# Patient Record
Sex: MALE | Race: WHITE | NOT HISPANIC OR LATINO | Employment: FULL TIME | URBAN - METROPOLITAN AREA
[De-identification: names, ages, dates, MRNs, and addresses within clinical notes are randomized per-mention and may not be internally consistent; named-entity substitution may affect disease eponyms.]

---

## 2017-05-10 ENCOUNTER — TRANSFERRED RECORDS (OUTPATIENT)
Dept: HEALTH INFORMATION MANAGEMENT | Facility: CLINIC | Age: 32
End: 2017-05-10

## 2017-06-22 ENCOUNTER — TRANSFERRED RECORDS (OUTPATIENT)
Dept: HEALTH INFORMATION MANAGEMENT | Facility: CLINIC | Age: 32
End: 2017-06-22

## 2021-05-11 ENCOUNTER — OFFICE VISIT - HEALTHEAST (OUTPATIENT)
Dept: FAMILY MEDICINE | Facility: CLINIC | Age: 36
End: 2021-05-11

## 2021-05-11 DIAGNOSIS — Z13.220 SCREENING FOR HYPERLIPIDEMIA: ICD-10-CM

## 2021-05-11 DIAGNOSIS — Z00.00 ANNUAL PHYSICAL EXAM: ICD-10-CM

## 2021-05-11 DIAGNOSIS — M62.838 MUSCLE SPASM: ICD-10-CM

## 2021-05-11 DIAGNOSIS — Z76.89 ENCOUNTER TO ESTABLISH CARE: ICD-10-CM

## 2021-05-11 DIAGNOSIS — H00.014 HORDEOLUM EXTERNUM OF LEFT UPPER EYELID: ICD-10-CM

## 2021-05-11 DIAGNOSIS — J45.990 EXERCISE-INDUCED ASTHMA: ICD-10-CM

## 2021-05-11 DIAGNOSIS — R06.83 SNORING: ICD-10-CM

## 2021-05-11 DIAGNOSIS — M25.611 SHOULDER STIFFNESS, RIGHT: ICD-10-CM

## 2021-05-11 DIAGNOSIS — M75.21 BICEPS TENDONITIS, RIGHT: ICD-10-CM

## 2021-05-11 DIAGNOSIS — Z13.1 SCREENING FOR DIABETES MELLITUS: ICD-10-CM

## 2021-05-11 DIAGNOSIS — F32.A DEPRESSION, UNSPECIFIED DEPRESSION TYPE: ICD-10-CM

## 2021-05-11 LAB
CHOLEST SERPL-MCNC: 226 MG/DL
FASTING STATUS PATIENT QL REPORTED: YES
FASTING STATUS PATIENT QL REPORTED: YES
GLUCOSE BLD-MCNC: 68 MG/DL (ref 70–99)
HDLC SERPL-MCNC: 50 MG/DL
LDLC SERPL CALC-MCNC: 158 MG/DL
TRIGL SERPL-MCNC: 91 MG/DL

## 2021-05-11 ASSESSMENT — MIFFLIN-ST. JEOR: SCORE: 1778.5

## 2021-05-27 VITALS
RESPIRATION RATE: 16 BRPM | SYSTOLIC BLOOD PRESSURE: 132 MMHG | OXYGEN SATURATION: 98 % | BODY MASS INDEX: 28.57 KG/M2 | WEIGHT: 188.5 LBS | TEMPERATURE: 98 F | HEART RATE: 88 BPM | HEIGHT: 68 IN | DIASTOLIC BLOOD PRESSURE: 88 MMHG

## 2021-05-28 ASSESSMENT — ASTHMA QUESTIONNAIRES: ACT_TOTALSCORE: 23

## 2021-06-01 ENCOUNTER — COMMUNICATION - HEALTHEAST (OUTPATIENT)
Dept: FAMILY MEDICINE | Facility: CLINIC | Age: 36
End: 2021-06-01

## 2021-06-02 ENCOUNTER — OFFICE VISIT - HEALTHEAST (OUTPATIENT)
Dept: PHYSICAL THERAPY | Facility: REHABILITATION | Age: 36
End: 2021-06-02

## 2021-06-02 ENCOUNTER — MEDICAL CORRESPONDENCE (OUTPATIENT)
Dept: HEALTH INFORMATION MANAGEMENT | Facility: CLINIC | Age: 36
End: 2021-06-02

## 2021-06-02 DIAGNOSIS — M25.512 PAIN OF BOTH SHOULDER JOINTS: ICD-10-CM

## 2021-06-02 DIAGNOSIS — M25.511 PAIN OF BOTH SHOULDER JOINTS: ICD-10-CM

## 2021-06-02 DIAGNOSIS — M54.2 CERVICALGIA: ICD-10-CM

## 2021-06-02 DIAGNOSIS — M25.511 PAIN IN JOINT OF RIGHT SHOULDER: ICD-10-CM

## 2021-06-02 DIAGNOSIS — R29.3 ABNORMAL POSTURE: ICD-10-CM

## 2021-06-06 ENCOUNTER — HEALTH MAINTENANCE LETTER (OUTPATIENT)
Age: 36
End: 2021-06-06

## 2021-06-14 ENCOUNTER — COMMUNICATION - HEALTHEAST (OUTPATIENT)
Dept: FAMILY MEDICINE | Facility: CLINIC | Age: 36
End: 2021-06-14

## 2021-06-14 ENCOUNTER — OFFICE VISIT - HEALTHEAST (OUTPATIENT)
Dept: FAMILY MEDICINE | Facility: CLINIC | Age: 36
End: 2021-06-14

## 2021-06-14 ENCOUNTER — COMMUNICATION - HEALTHEAST (OUTPATIENT)
Dept: SCHEDULING | Facility: CLINIC | Age: 36
End: 2021-06-14

## 2021-06-14 DIAGNOSIS — J30.9 ALLERGIC RHINITIS, UNSPECIFIED SEASONALITY, UNSPECIFIED TRIGGER: ICD-10-CM

## 2021-06-14 DIAGNOSIS — J45.990 EXERCISE-INDUCED ASTHMA: ICD-10-CM

## 2021-06-14 DIAGNOSIS — Z01.818 PREOP GENERAL PHYSICAL EXAM: ICD-10-CM

## 2021-06-14 DIAGNOSIS — R06.83 SNORING: ICD-10-CM

## 2021-06-14 DIAGNOSIS — F32.A DEPRESSION, UNSPECIFIED DEPRESSION TYPE: ICD-10-CM

## 2021-06-14 DIAGNOSIS — N20.1 URETERAL CALCULUS: ICD-10-CM

## 2021-06-14 ASSESSMENT — SLEEP AND FATIGUE QUESTIONNAIRES
HOW LIKELY ARE YOU TO NOD OFF OR FALL ASLEEP WHILE SITTING AND TALKING TO SOMEONE: WOULD NEVER DOZE
HOW LIKELY ARE YOU TO NOD OFF OR FALL ASLEEP WHILE LYING DOWN TO REST IN THE AFTERNOON WHEN CIRCUMSTANCES PERMIT: SLIGHT CHANCE OF DOZING
HOW LIKELY ARE YOU TO NOD OFF OR FALL ASLEEP WHEN YOU ARE A PASSENGER IN A CAR FOR AN HOUR WITHOUT A BREAK: SLIGHT CHANCE OF DOZING
HOW LIKELY ARE YOU TO NOD OFF OR FALL ASLEEP IN A CAR, WHILE STOPPED FOR A FEW MINUTES IN TRAFFIC: SLIGHT CHANCE OF DOZING
HOW LIKELY ARE YOU TO NOD OFF OR FALL ASLEEP WHILE SITTING AND READING: HIGH CHANCE OF DOZING
HOW LIKELY ARE YOU TO NOD OFF OR FALL ASLEEP WHILE SITTING INACTIVE IN A PUBLIC PLACE: MODERATE CHANCE OF DOZING
HOW LIKELY ARE YOU TO NOD OFF OR FALL ASLEEP WHILE WATCHING TV: HIGH CHANCE OF DOZING
HOW LIKELY ARE YOU TO NOD OFF OR FALL ASLEEP WHILE SITTING QUIETLY AFTER LUNCH WITHOUT ALCOHOL: MODERATE CHANCE OF DOZING

## 2021-06-14 ASSESSMENT — ENCOUNTER SYMPTOMS
NECK PAIN: 1
STIFFNESS: 1
HEMATURIA: 0
TROUBLE SWALLOWING: 0
DYSURIA: 0
MYALGIAS: 1
DIFFICULTY URINATING: 0
SORE THROAT: 0
BACK PAIN: 0
MUSCLE WEAKNESS: 0
SINUS CONGESTION: 1
JOINT SWELLING: 0
HOARSE VOICE: 0
SINUS PAIN: 1
SMELL DISTURBANCE: 0
ARTHRALGIAS: 0
FLANK PAIN: 1
TASTE DISTURBANCE: 0
MUSCLE CRAMPS: 0
NECK MASS: 0

## 2021-06-14 ASSESSMENT — MIFFLIN-ST. JEOR: SCORE: 1787.43

## 2021-06-14 ASSESSMENT — PATIENT HEALTH QUESTIONNAIRE - PHQ9: SUM OF ALL RESPONSES TO PHQ QUESTIONS 1-9: 7

## 2021-06-15 ENCOUNTER — VIRTUAL VISIT (OUTPATIENT)
Dept: SLEEP MEDICINE | Facility: CLINIC | Age: 36
End: 2021-06-15
Payer: COMMERCIAL

## 2021-06-15 VITALS — HEIGHT: 69 IN | WEIGHT: 188 LBS | BODY MASS INDEX: 27.85 KG/M2

## 2021-06-15 DIAGNOSIS — G47.10 HYPERSOMNIA: ICD-10-CM

## 2021-06-15 DIAGNOSIS — R06.83 SNORING: ICD-10-CM

## 2021-06-15 DIAGNOSIS — G47.00 FREQUENT NOCTURNAL AWAKENING: ICD-10-CM

## 2021-06-15 DIAGNOSIS — R06.81 WITNESSED EPISODE OF APNEA: Primary | ICD-10-CM

## 2021-06-15 PROBLEM — F32.A DEPRESSION, UNSPECIFIED DEPRESSION TYPE: Status: ACTIVE | Noted: 2021-05-11

## 2021-06-15 PROBLEM — J45.990 EXERCISE-INDUCED ASTHMA: Status: ACTIVE | Noted: 2021-05-11

## 2021-06-15 PROBLEM — N20.1 URETERAL CALCULUS: Status: ACTIVE | Noted: 2021-06-14

## 2021-06-15 PROCEDURE — 99203 OFFICE O/P NEW LOW 30 MIN: CPT | Mod: GT | Performed by: INTERNAL MEDICINE

## 2021-06-15 RX ORDER — ALBUTEROL SULFATE 90 UG/1
AEROSOL, METERED RESPIRATORY (INHALATION)
COMMUNITY
Start: 2021-05-22 | End: 2022-08-10

## 2021-06-15 RX ORDER — OXYCODONE AND ACETAMINOPHEN 5; 325 MG/1; MG/1
TABLET ORAL
COMMUNITY
Start: 2021-06-12 | End: 2021-11-19

## 2021-06-15 RX ORDER — CYCLOBENZAPRINE HCL 5 MG
TABLET ORAL
COMMUNITY
Start: 2021-05-22 | End: 2021-09-30

## 2021-06-15 RX ORDER — FLUTICASONE PROPIONATE 50 MCG
SPRAY, SUSPENSION (ML) NASAL
COMMUNITY
Start: 2021-06-14

## 2021-06-15 RX ORDER — ONDANSETRON 4 MG/1
4 TABLET, ORALLY DISINTEGRATING ORAL
COMMUNITY
Start: 2021-05-25 | End: 2021-11-19

## 2021-06-15 RX ORDER — MONTELUKAST SODIUM 10 MG/1
TABLET ORAL
COMMUNITY
Start: 2021-05-05 | End: 2022-08-10

## 2021-06-15 RX ORDER — TAMSULOSIN HYDROCHLORIDE 0.4 MG/1
0.4 CAPSULE ORAL DAILY
COMMUNITY
Start: 2021-05-28 | End: 2021-11-19

## 2021-06-15 RX ORDER — LORATADINE 10 MG/1
10 TABLET ORAL
COMMUNITY
Start: 2021-06-14 | End: 2022-07-20

## 2021-06-15 ASSESSMENT — MIFFLIN-ST. JEOR: SCORE: 1780.2

## 2021-06-15 NOTE — PATIENT INSTRUCTIONS
Your BMI is Body mass index is 28.17 kg/m .  Weight management is a personal decision.  If you are interested in exploring weight loss strategies, the following discussion covers the approaches that may be successful. Body mass index (BMI) is one way to tell whether you are at a healthy weight, overweight, or obese. It measures your weight in relation to your height.  A BMI of 18.5 to 24.9 is in the healthy range. A person with a BMI of 25 to 29.9 is considered overweight, and someone with a BMI of 30 or greater is considered obese. More than two-thirds of American adults are considered overweight or obese.  Being overweight or obese increases the risk for further weight gain. Excess weight may lead to heart disease and diabetes.  Creating and following plans for healthy eating and physical activity may help you improve your health.  Weight control is part of healthy lifestyle and includes exercise, emotional health, and healthy eating habits. Careful eating habits lifelong are the mainstay of weight control. Though there are significant health benefits from weight loss, long-term weight loss with diet alone may be very difficult to achieve- studies show long-term success with dietary management in less than 10% of people. Attaining a healthy weight may be especially difficult to achieve in those with severe obesity. In some cases, medications, devices and surgical management might be considered.  What can you do?  If you are overweight or obese and are interested in methods for weight loss, you should discuss this with your provider.     Consider reducing daily calorie intake by 500 calories.     Keep a food journal.     Avoiding skipping meals, consider cutting portions instead.    Diet combined with exercise helps maintain muscle while optimizing fat loss. Strength training is particularly important for building and maintaining muscle mass. Exercise helps reduce stress, increase energy, and improves fitness.  Increasing exercise without diet control, however, may not burn enough calories to loose weight.       Start walking three days a week 10-20 minutes at a time    Work towards walking thirty minutes five days a week     Eventually, increase the speed of your walking for 1-2 minutes at time    In addition, we recommend that you review healthy lifestyles and methods for weight loss available through the National Institutes of Health patient information sites:  http://win.niddk.nih.gov/publications/index.htm    And look into health and wellness programs that may be available through your health insurance provider, employer, local community center, or zuly club.    Weight management plan: Patient was referred to their PCP to discuss a diet and exercise plan.  Patient education: What is a sleep study?     What is a sleep study? -- A sleep study is a test that measures how well you sleep and checks for sleep problems. For some sleep studies, you stay overnight in a sleep lab at a hospital or sleep center.     What happens during a sleep study? -- Before you go to sleep, a technician attaches small, sticky patches called  electrodes  to your head, chest, and legs. He or she will also place a small tube beneath your nose and might wrap 1 or 2 belts around your chest.   Each of these items has wires that connect to monitors. The monitors record your movement, brain activity, breathing, and other body functions while you sleep.  If you have a history of trouble falling asleep, your doctor might prescribe a medicine to help you fall asleep in the lab. If you have never taken the medicine before, your doctor might ask you take it on a night before your sleep study to see how it affects you.   Why might my doctor order a sleep study? -- Your doctor will order a sleep study if he or she thinks you have sleep apnea or a different condition that makes you:   ?Have sudden jerking leg movements while you sleep, called  periodic limb  movements.    ?Feel very sleepy during the day and fall asleep all of a sudden, called  narcolepsy.    ?Have trouble falling asleep or staying asleep over a long period of time, called  chronic insomnia.    ?Do odd things while you sleep, such as walking.  How should I prepare for a sleep study? -- On the day of your sleep study, you should:   ?Avoid alcohol   ?Avoid drinking coffee, tea, sodas, and other drinks that have caffeine in the afternoon and evening   ?Take all of your regular medicines     The cost of care estimate line is [phone]. They are able to give the patient an estimate of the charges and also an estimate of their insurance coverage/patient responsibility.   After your sleep study is performed, please call us at [phone] or [phone]  to schedule for a follow up to review the results of the sleep study.    Please bring one tab of low dose melatonin 3 mg or less to the night of the study.    Melatonin intake is completely voluntary.    You may take own melatonin after arrival to sleep center. Do not drive or operate machinery after intake of melatonin.

## 2021-06-15 NOTE — PROGRESS NOTES
Loraine Cuadra is a 35 year old who is being evaluated via a billable video visit.      How would you like to obtain your AVS? MyChart  If the video visit is dropped, the invitation should be resent by: Text to cell phone: [phone]  Will anyone else be joining your video visit? No    Does patient have any form of state insurance? NO    Do you have wifi? YES  Do you have a smart phone? YES  Can you download an rashi on your phone comfortably with out assistance? YES  Can you watch a Hundo video? YES     Angela Baez MA    Video Start Time: 9:31 AM  Video-Visit Details    Type of service:  Video Visit    Video End Time:9:56 AM    Originating Location (pt. Location): Home    Distant Location (provider location):  Telehealth     Platform used for Video Visit: [facility]    Answers for HPI/ROS submitted by the patient on 6/14/2021   General Symptoms: No  Skin Symptoms: No  HENT Symptoms: Yes  EYE SYMPTOMS: No  HEART SYMPTOMS: No  LUNG SYMPTOMS: No  INTESTINAL SYMPTOMS: No  URINARY SYMPTOMS: Yes  REPRODUCTIVE SYMPTOMS: No  SKELETAL SYMPTOMS: Yes  BLOOD SYMPTOMS: No  NERVOUS SYSTEM SYMPTOMS: No  MENTAL HEALTH SYMPTOMS: No  Ear pain: No  Ear discharge: No  Hearing loss: No  Tinnitus: No  Nosebleeds: No  Congestion: Yes  Sinus pain: Yes  Trouble swallowing: No   Voice hoarseness: No  Mouth sores: No  Sore throat: No  Tooth pain: No  Gum tenderness: No  Bleeding gums: No  Change in taste: No  Change in sense of smell: No  Dry mouth: No  Hearing aid used: No  Neck lump: No  Trouble holding urine or incontinence: No  Pain or burning: No  Trouble starting or stopping: No  Increased frequency of urination: No  Blood in urine: No  Decreased frequency of urination: No  Frequent nighttime urination: No  Flank pain: Yes  Difficulty emptying bladder: No  Back pain: No  Muscle aches: Yes  Neck pain: Yes  Swollen joints: No  Joint pain: No  Bone pain: No  Muscle cramps: No  Muscle weakness: No  Joint stiffness: Yes  Bone  fracture: No    Additional 15 minutes was spent performing the following:    -Preparing to see the patient  -Obtaining and/or reviewing separately obtained history   -Ordering medications, tests, or procedures   -Documenting clinical information in the electronic or other health record     Thank you for the opportunity to participate in the care of  Loraine Cuadra.    Assessment and Plan:    In summary Loraine Cuadra is a 35 year old year old male here for sleep disturbance.  1. Witness apnea/Hypersomnia/Snoring/Frequent nocturnal awakening.   Loraine Cuadra has high risk for obstructive sleep apnea based on the history of witness apnea, hypersomnia snoring and a crowded airway. I educated the patient on the underlying pathophysiology of obstructive sleep apnea. We reviewed the risks associated with sleep apnea, including increased cardiovascular risk and overall death. We talked about treatments briefly. I recommend getting an baseline nocturnal polysomnography. The patient should return to the clinic to discuss results and treatment option in a patient-centered approach.    History of cranial facial surgery? Yes. He has had 5 sinus surgeries. Will need ENT evaluation before PAP therapy.    History of present illness:    He is a 35 year old male who comes to the virtual clinic with a chief complaint of excessive daytime sleepiness that has been going on for at least 10 years. The patient has been informed by friends and loved ones that he has significant pauses in his breathing during sleep followed by loud snoring. He also complains of frequent nocturnal awakening. The patient's review of systems is also significant for neck and shoulder pain which may awakening him at night. He is getting PT and shoulder surgery to address these issues.     Ideal Sleep-Wake Cycle(devoid of societal pressure):    Patient would try to initiate sleep at around midnight with a sleep latency of less than 10 minutes. The  patient would have 5 awakenings. Final wake up time is around 8 AM.    RASHMI:  RASHMI Total Score: 10  Total score - [place]: 13 (6/14/2021  9:37 PM)    Patient told to return in one week after the sleep study is interpreted.    Patient Active Problem List   Diagnosis     Exercise-induced asthma     Depression, unspecified depression type     Ureteral calculus     No past medical history on file.  No past surgical history on file.  Current Outpatient Medications   Medication Sig Dispense Refill     albuterol (PROAIR HFA/PROVENTIL HFA/VENTOLIN HFA) 108 (90 Base) MCG/ACT inhaler INHALE 1 PUFF EVERY 6 (SIX) HOURS AS NEEDED FOR WHEEZING.       cyclobenzaprine (FLEXERIL) 5 MG tablet Take 1 tablet (5 mg total) by mouth daily as needed for muscle spasms.       fluticasone (FLONASE) 50 MCG/ACT nasal spray Two sprays each nostril once daily       loratadine (CLARITIN) 10 MG tablet Take 10 mg by mouth       montelukast (SINGULAIR) 10 MG tablet        ondansetron (ZOFRAN-ODT) 4 MG ODT tab Take 4 mg by mouth       oxyCODONE-acetaminophen (PERCOCET) 5-325 MG tablet TAKE ONE TABLET BY MOUTH EVERY SIX HOURS AS NEEDED FOR PAIN       tamsulosin (FLOMAX) 0.4 MG capsule Take 0.4 mg by mouth daily       Morphine  Social History     Socioeconomic History     Marital status: Single     Spouse name: Not on file     Number of children: Not on file     Years of education: Not on file     Highest education level: Not on file   Occupational History     Not on file   Social Needs     Financial resource strain: Not on file     Food insecurity     Worry: Not on file     Inability: Not on file     Transportation needs     Medical: Not on file     Non-medical: Not on file   Tobacco Use     Smoking status: Never Smoker     Smokeless tobacco: Never Used   Substance and Sexual Activity     Alcohol use: Not on file     Drug use: Not on file     Sexual activity: Not on file   Lifestyle     Physical activity     Days per week: Not on file     Minutes per  session: Not on file     Stress: Not on file   Relationships     Social connections     Talks on phone: Not on file     Gets together: Not on file     Attends Sikh service: Not on file     Active member of club or organization: Not on file     Attends meetings of clubs or organizations: Not on file     Relationship status: Not on file     Intimate partner violence     Fear of current or ex partner: Not on file     Emotionally abused: Not on file     Physically abused: Not on file     Forced sexual activity: Not on file   Other Topics Concern     Parent/sibling w/ CABG, MI or angioplasty before 65F 55M? Not Asked   Social History Narrative     Not on file     Family History   Problem Relation Age of Onset     Sleep Apnea Father        Physical Exam:  GEN: NAD,   Head: Normocephalic.  EYES: EOMI  ENT: Oropharynx is clear, [place] class 4+ airway.   Psych: normal mood, normal affect     Labs/Studies:     No results found for: PH, PHARTERIAL, PO2, [id number], SAT, PCO2, HCO3, BASEEXCESS, ASAEL, BEB  No results found for: TSH  No results found for: GLC  No results found for: HGB  No results found for: BUN, CR  No results found for: AST, ALT, GGT, ALKPHOS, BILITOTAL, BILICONJ, BILIDIRECT, DOMINGA  No results found for: UAMP, UBARB, BENZODIAZEUR, UCANN, UCOC, OPIT, UPCP    No results for input(s): NA, POTASSIUM, CHLORIDE, CO2, ANIONGAP, GLC, BUN, CR, LILIAN in the last [id number] hours.    No results found for: SHAWN Gannon DO  Board Certified in Internal Medicine and Sleep Medicine    (Note created with Dragon voice recognition and unintended spelling errors and word substitutions may occur)

## 2021-06-16 PROBLEM — R06.83 SNORING: Status: ACTIVE | Noted: 2021-05-11

## 2021-06-16 PROBLEM — M75.21 BICEPS TENDONITIS, RIGHT: Status: ACTIVE | Noted: 2021-05-11

## 2021-06-17 NOTE — PROGRESS NOTES
MALE PREVENTATIVE EXAM    Assessment and Plan:   Patient has been advised of split billing requirements and indicates understanding: Yes    Annual physical exam    Encounter to establish care  Moved all around [place], recently in [place]. Here to establish care.     Exercise-induced asthma  - albuterol (PROAIR HFA;PROVENTIL HFA;VENTOLIN HFA) 90 mcg/actuation inhaler; Inhale 1 puff every 6 (six) hours as needed for wheezing.  Dispense: 18 g; Refill: 5  - montelukast (SINGULAIR) 10 mg tablet; Take 1 tablet (10 mg total) by mouth at bedtime.  Dispense: 90 tablet; Refill: 3    Muscle spasm  - cyclobenzaprine (FLEXERIL) 5 MG tablet; Take 1 tablet (5 mg total) by mouth daily as needed for muscle spasms.  Dispense: 30 tablet; Refill: 3    Biceps tendonitis, right  Shoulder stiffness, right  Point tenderness to right shoulder at insertion of biceps tendon, recommend continuing nsaids. Restricted ROM of right shoulder compared to left. This has been a few years, recommend PT.   - Ambulatory referral to Adult PT- Internal    Hordeolum externum of left upper eyelid  Solid 3mm nodule in middle of left upper eyelid, started a few months ago. Tried warm compresses without any improvement  - Ambulatory referral to Ophthalmology - [facility] [place]    Snoring  Concern for sleep apnea  Concern for sleep apnea, will refer to sleep medicine for further evaluation. BP is borderline. Patient does have loud snoring, daytime drowsiness.   - Ambulatory referral to Sleep Medicine - [place]    Screening for hyperlipidemia  - Lipid [place] FASTING    Screening for diabetes mellitus  - Glucose        Next follow up:  Return in about 1 year (around 5/11/2022) for Annual physical, sooner if needed.  Patient will drop off biometric screening paperwork.   Will look into referral for his jaw click - dental vs maxillofacial vs ENT?      Immunization Review  Adult Imm Review: No immunizations due today  patient received first covid vaccine, next one  is upcoming. no vaccines until 2 weeks after that is completed    I discussed the following with the patient:   Adult Healthy Living: Importance of regular exercise  Healthy nutrition  Getting adequate sleep  Stress management      Subjective:   Chief Complaint: Loraine Cuadra is an 35 y.o. male here for a preventative health visit.    Patient has been advised of split billing requirements and indicates understanding: Yes  HPI:    Works at home with [employer].   With partner sandra, who works for [employer].     History of depression. Has seen a therapist before. Has a good support system.   Has fleeting suicidal thoughts but no plans to act.     Family history:   Uncle has type 2, dad has prediabetes.   Dad's half brother has type 1  Dad's mom had a stroke.     Surgical  History of 5 sinus surgies, also had turbinates removed, adenoids out.   Tubes in ears.       Jaw click.  Audible click, causing significant pain when he chews. Has been several years and has seen a dentist.      Shoulder pain  He noticed it a few years ago, was playing disc golf at the time. He is right handed. His right shoulder is painful in one specific spot, he was diagnosed with tendonitis. He takes ibuprofen, has tried some stretching but no formal PT. He also has    Healthy Habits  Are you taking a daily aspirin? No  Do you typically exercising at least 40 min, 3-4 times per week?  NO  Do you usually eat at least 4 servings of fruit and vegetables a day, include whole grains and fiber and avoid regularly eating high fat foods? NO  Have you had an eye exam in the past two years? NO  Do you see a dentist twice per year? NO  Do you have any concerns regarding sleep? YES    Safety Screen  If you own firearms, are they secured in a locked gun cabinet or with trigger locks? Yes  Do you feel you are safe where you are living?: Yes (5/11/2021 11:59 AM)  Do you feel you are safe in your relationship(s)?: Yes (5/11/2021 11:59  "AM)      Review of Systems:  Please see above.  The rest of the review of systems are negative for all systems.     Cancer Screening     Patient has no health maintenance due at this time          Patient Care Team:  Britt Cuba MD as PCP - General (Family Medicine)        History     Reviewed By Date/Time Sections Reviewed    Britt Cuba MD 5/11/2021 12:08 PM Medical, Surgical, Tobacco, Family    Shelby Orellana CMA 5/11/2021 11:59 AM Tobacco, Alcohol, Drug Use            Objective:   Vital Signs:   Visit Vitals  /88   Pulse 88   Temp 98  F (36.7  C) (Oral)   Resp 16   Ht 5' 8.25\" (1.734 m)   Wt 188 lb 8 oz (85.5 kg)   SpO2 98%   BMI 28.45 kg/m           PHYSICAL EXAM  General: Alert and oriented, in NAD.  Eyes: PERRL, EOMI, sclera normal.  HENT: Normocephalic, no pharyngeal erythema, MMM. Audible jaw click when opening his mouth all the way and biting down, palpable shift in joint of mandible, symmetrical muscles in jaw.   Neck: Supple, no adenopathy.  Heart: Normal S1 and S2, regular rhythm. No murmurs, gallops, rubs.  Lungs: CTA bilaterally, no wheezes, no crackles, no rhonchi.  Abdomen: Soft, non-tender, non-distended, BS+.   MSK: Normal ROM of lower extremities. Tenderness over insertion of right biceps.   Normal ROM in shoulders. Negative Job's, Thomas, drop arm, neer's test.  Noticeable asymmetry when his hands are behind his back and attempting to adduct, his right arm unable to flex even to 90 degrees.   Neuro: Alert and oriented x 3. Moves all extremities. No focal deficits noted.  Extremities: Peripheral pulses intact, no peripheral edema.  Skin: Warm and well perfused, no rashes noted.  Psych: Normal mood and affect.        Additional Screenings Completed Today:   See assessment/plan      ===================  Options for treatment and follow-up care were reviewed with the patient. Loraine Cuadra and/or guardian was engaged and actively involved in the decision making process. Loraine" ANNITA Alejandree and/or guardian verbalized understanding of the options discussed and was satisfied with the final plan.    Britt Cuba MD

## 2021-06-25 NOTE — PROGRESS NOTES
[facility]   Shoulder Initial Evaluation    Patient Name: Loraine Cuadra  Date of evaluation: 6/2/2021  Referral Diagnosis: Shoulder stiffness, right  Referring provider: Britt Cuba MD  Visit Diagnosis:     ICD-10-CM    1. Pain in joint of right shoulder  M25.511    2. Cervicalgia  M54.2    3. Pain of both shoulder joints  M25.511     M25.512    4. Abnormal posture  R29.3        Assessment:        Loraine Cuadra is a 35 y.o. male who presents to therapy today with chief complaints of bilateral shoulder pain and neck pain. Onset date of sx was in 2006, on and off but gradually worsening pain and ROM over the past few months.  Pt reported h/o rotator cuff tendonitis in 2006.  Pain symptoms are worse at night, in the neck and bilateral shoulders and described as burning and pain.  Functional impairments include reaching, lifting, sleeping throughout the night, looking down.  Pt demo's signs and sx consistent with chronic shoulder and neck pain with rounded shoulder forward head posture and weakness throughout postural muscles. Not testing consistent with tendonitis on this date. Initiated HEP with strengthening exercises.     The POC is dynamic and will be modified on an ongoing basis.  Barriers to achieving goals as noted in the assessment section may affect outcome.  Prognosis to achieve goals is  good   Pt. is appropriate for skilled PT intervention as outlined in the Plan of Care (POC).  Pt. is a good candidate for skilled PT services to improve pain levels and function.  Plan of care and goals were established in collaboration with patient.     Goals:  Pt. will demonstrate/verbalize independence in self-management of condition in : 6 weeks  Pt. will be independent with home exercise program in : 6 weeks    Pt will: improve shoulder ROM to be equal ivcki and painfree, to be able to perform reaching overhead/dressing without limitation, within 8 weeks.  Pt will: report improvement in  sleep waking from pain less than or equal to one time a night, within 8 weeks.      Patient's expectations/goals are realistic.    Barriers to Learning or Achieving Goals:  Chronicity of the problem.       Plan / Patient Instructions:           Plan of Care:   Authorization / Certification Start Date: 06/02/21  Authorization / Certification End Date: 08/31/21  Authorization / Certification Number of Visits: 10  Communication with: Referral Source  Patient Related Instruction: Nature of Condition;Treatment plan and rationale;Self Care instruction;Basis of treatment;Body mechanics;Posture;Precautions;Next steps;Expected outcome  Times per Week: 1  Number of Weeks: up to 10  Number of Visits: 4-8  Discharge Planning: when goals are met or patient is independent in management  Therapeutic Exercise: Stretching;ROM;Strengthening  Neuromuscular Reeducation: kinesio tape;posture;balance/proprioception;TNE;core  Manual Therapy: soft tissue mobilization;myofascial release;joint mobilization;muscle energy  Modalities: TENS      POC and pathology of condition were reviewed with patient.  Pt. is in agreement with the Plan of Care  A Home Exercise Program (HEP) was initiated today.  Pt. was instructed in exercises by PT and patient was given a handout with detailed instructions.    Plan for next visit: progress exercise, scap wall slide  .   Subjective:          Patient reports that bilateral shoulder pain started in 2006. The right shoulder recently lost more movement. He went to a doctor then and did exercises but it did not really get that much better. He deals with it.    He noticed the loosing motion a couple weeks, gradually has come on. And realized that he uses left arm more for some things that require motion. Pain is wide spread around shoulder but worst at superior GH joint.    It does wake him up at night both arms will be numb.  Neck issue seems to be compensating for shoulders, super tight muscle spasms and  tightness and pain. Uses muscle relaxer. Can get that bad, about once every other week (uses medicine)    Social information:   Living Situation:single family home   Occupation: computer work full time   Work Status:Working full time  No problem with current job can sit at desk without neck pain.    Most of the pain notice it at night- does not mater what he does can be active or sitting. Looking at phone all day and sitting on couch makes it worse. Looking down makes it worse.     Limits in motion of right shoulder just kind of stops    Pain Ratin  Pain rating at best: 0 (normally at a 0)  Pain rating at worst: 8  Pain description: burning    Functional limitations are described as occurring with:   Looking down, sitting on couch for long, reaching behind back right hand and movement overhead right arm, lifting (weak and painful)    Patient reports benefit from medications,  Not from massage or any positioning.    Exercise: not a lot- do yard work walk dog, trying to get better at that        Objective:      Patient Outcome Measures :    Shoulder Pain and Disability Index (SPADI) in %: 16     Scores range from 0-100%, where a score of 0% represents minimal pain and maximal function. The minimal clincically important difference is a score reduction of 10%.    Shoulder Examination  1. Pain in joint of right shoulder     2. Cervicalgia     3. Pain of both shoulder joints     4. Abnormal posture       Involved side: Right, bilateral but right more limited in motion   Posture Observation:      Cervical:  Moderate forward head  Shoulder/Thoracic complex: Moderate bilateral scapular protraction   Cervical Clearing: Normal    Shoulder/Elbow ROM    Date: 21     Shoulder and Elbow ROM ( )   AROM in degrees AROM in degrees AROM in degrees    Right Left Right Left Right Left   Shoulder Flexion (0-180 ) 145 155       Shoulder Abduction (0-180 ) 155 160       Shoulder Extension (0-60 ) 40 70       Shoulder ER (0-90 )  equAL T5 T5       Shoulder IR (0-70 ) 26 cm less motion lower on back stiffness        Shoulder IR/Ext         Elbow Flexion (150 )         Elbow Extension (0 )          PROM in degrees PROM in degrees PROM in degrees    Right Left Right Left Right Left   Shoulder Flexion (0-180 )         Shoulder Abduction (0-180 )         Shoulder Extension (0-60 )         Shoulder ER (0-90 ) 90 85       Shoulder IR (0-70 ) 80 85       Elbow Flexion (150 )         Elbow Extension (0 )               Shoulder/Elbow Strength vicki shoulder MMT 5/5 unless noted below and painfree  Date: 06/02/21     Shoulder/Elbow Strength (/5)  Manual Muscle Test (MMT) MMT MMT MMT    Right Left Right Left Right Left   Shoulder Flexion         Supraspinatus (thumb down- resisted flexion)         Shoulder Abduction         Shoulder Extension         Shoulder External Rotation         Shoulder Internal Rotation         Elbow Flexion         Elbow Extension         Other:         Other:           Cervical ROM: (no pain just stiffness)  - Flexion 65  - Extension 45  - Side bending R 45, L 42  - Rotation: L55, R 45    Palpation: non tender to palpation of vicki shoulders. Tender to palpation suboccipital muscles and vicki paraspinal muscles upper cervical spine    Joint Assessment:  Sternoclavicular Joint Assessment: WNL  Acromioclavicular Joint Assessment: WNL  Scapulothoracic Joint Assessment: WNL.  Glenohumeral Joint Assessment:Hypermobile.    Shoulder Special Tests     Impingement Cluster Right (+/-) Left (+/-) Rotator Cuff Tests Right (+/-) Left (+/-)   Thomas-Jet - - Supraspinatus  Drop Arm Sign  Empty Can - -   Painful Arc - - Teres and infra  Hornblowers   External rotation lag sign - -   Neer Test - - Subscapularis  Lift off - -   Infraspinatus Test (ER)        AC Tests Right (+/-) Left (+/-) IRLS (supraspinatus)     Active Compression/sheer - - Labral Tests Right (+/-) Left (+/-)   Crossbody Adduction - - Vasquez's - -      Jerk Test (posterior  inferior)     GH Instability Tests Right (+/-) Left (+/-) Crank Test     Sulcus Sign - - Biceps Tests Right (+/-) Left (+/-)   Apprehension - - Speed - -   Relocation - - Yergason s - -   Load and shift   Other:     Other:   Other:cervical rotation lateral flexion (for thoracic outlet)         Exercises:  Exercise #1: scap retraction throughout the day 10 every hour working  Comment #1: Row L4 band x10  Exercise #2: chin tuck and lift DNF holds 5 sec x10  Comment #2: shoulder IR with L1 band x10  Exercise #3: shoulder ER with L1 band x10          Treatment Today    TREATMENT MINUTES COMMENTS   Evaluation 15 Low Complexity Evaluation  - Patient educated on pathology  - Discussed POC   Self-care/ Home management     Manual therapy     Neuromuscular Re-education     Therapeutic Activity     Therapeutic Exercises 24 - Education and performance of HEP, provided pictures with written instruction.  Exercises:  Exercise #1: scap retraction throughout the day 10 every hour working  Comment #1: Row L4 band x10  Exercise #2: chin tuck and lift DNF holds 5 sec x10  Comment #2: shoulder IR with L1 band x10  Exercise #3: shoulder ER with L1 band x10         Gait training     Modality__________________                Total 39    Blank areas are intentional and mean the treatment did not include these items.     PT Evaluation Code: (Please list factors)  Patient History/Comorbidities: see PMH  Examination: See above, Shoulder eval 4+ elements  Clinical Presentation: stable  Clinical Decision Making: low    Patient History/  Comorbidities Examination  (body structures and functions, activity limitations, and/or participation restrictions) Clinical Presentation Clinical Decision Making (Complexity)   No documented Comorbidities or personal factors 1-2 Elements Stable and/or uncomplicated Low   1-2 documented comorbidities or personal factor 3 Elements Evolving clinical presentation with changing characteristics Moderate   3-4  documented comorbidities or personal factors 4 or more Unstable and unpredictable High            Jacquelyn Yeung, PT, DPT  11:31 AM

## 2021-06-25 NOTE — TELEPHONE ENCOUNTER
Name of form/paperwork: [employer]  Have you been seen for this request:   Do we have the form: Yes, Blue Folder  When is form needed by: ASAP  How would you like the form returned: Faxed   and Phone#:Loraine Cuadra  Fax Number: [phone]  Patient Notified form requests are processed in 3-5 business days: yes  Okay to leave a detailed message? Yes, do not need original back, please just fax when completed to the provided fax number.

## 2021-06-25 NOTE — TELEPHONE ENCOUNTER
Form completed and faxed successfully. Sent to EHR scanning. Left detailed message to let patient know that task is completed.

## 2021-06-25 NOTE — TELEPHONE ENCOUNTER
New Appointment Needed  What is the reason for the visit:    Pre-Op Appt Request  When is the surgery? :  6/28  Where is the surgery?:   [facility]   Who is the surgeon? :  Dr. De Paz  What type of surgery is being done?: Kidney Stones  Provider Preference: PCP only  How soon do you need to be seen?: asap  Waitlist offered?: No  Okay to leave a detailed message:  Yes    pre op kidney stones 6/28 at [facility] Dr. De Paz

## 2021-06-26 NOTE — PATIENT INSTRUCTIONS - HE
"  Preparing for Your Surgery  Getting started  A surgery nurse will call you to review your health history and instructions. They will give you an arrival time based on your scheduled surgery time.  Please be ready to share the following:    Your doctor's clinic name and phone number    Your medical, surgical and anesthesia history    A list of allergies and sensitivities    A list of medicines, including herbal treatments and over-the-counter drugs    Whether the patient has a legal guardian (ask how to send us the papers in advance)  If your child is having surgery, please ask for a copy of Preparing for Your Child's Surgery.    Preparing for surgery    Within 30 days of surgery: Have an exam at your family clinic (primary care clinic), or go to a pre-operative clinic. This exam is called a \"History and Physical,\" or H&P.    At your H&P exam, talk to your care team about all medicines you take. If you need to stop any medicines before surgery, ask when to start taking them again.  ? We do this for your safety. Many medicines can make you bleed too much during surgery. Some change how well surgery (anesthesia) drugs work.    Call your insurance company to see what it will and won't pay for. Ask if they need to pre-approve the surgery. (If no insurance, call [phone].)    Call your surgeon's clinic if there's any change in your health. This includes signs of a cold or flu (sore throat, runny nose, cough, rash, fever). It also includes a scrape or scratch near the surgery site.    If you have questions on the day of surgery, call your surgery center.  Eating and drinking guidelines  For your safety: Unless your surgeon tells you otherwise, follow the guidelines below.    Eat and drink as usual until 8 hours before surgery. After that, no food or milk.    Drink clear liquids until 2 hours before surgery. These are liquids you can see through, like water, Gatorade and Propel Water. You may also have black coffee " and tea (no cream or milk).    Nothing by mouth within 2 hours of surgery. This includes gum, candy and breath mints.    Stop alcohol the midnight before surgery.    If your family clinic tells you to take medicine on the morning of surgery, it's okay to take it with a sip of water.  Preventing infection    Shower or bathe the night before and morning of your surgery. Follow the instructions your clinic gave you. (If no instructions, use regular soap.)    Don't shave or clip hair near your surgery site. This can lead to skin infection.    Don't smoke the morning of surgery. Smoking increases the risk of infection. You may chew nicotine gum up to 2 hours before surgery. A nicotine patch is okay.  ? Note: Some surgeries require you to completely quit smoking and nicotine. Check with your surgeon.    Your care team will make every effort to keep you safe from infection. We will:  ? Clean our hands often with soap and water (or an alcohol-based hand rub).  ? Clean the skin at your surgery site with a special soap that kills germs. We'll also remove hair from the site as needed.  ? Wear special hair covers, masks, gowns and gloves during surgery.  ? Give antibiotic medicine, if prescribed. Not all surgeries need antibiotics.  What to bring on the day of surgery    Photo ID and insurance card    Copy of your health care directive, if you have one    Glasses and hearing aides (bring cases)  ? You can't wear contacts during surgery    Inhaler and eye drops, if you use them (tell us about these when you arrive)    CPAP machine or breathing device, if you use them    A few personal items, if spending the night    If you have . . .  ? A pacemaker or ICD (cardiac defibrillator): Bring the ID card.  ? An implanted stimulator: Bring the remote control.  ? A legal guardian: Bring a copy of the certified (court-stamped) guardianship papers.  Please remove any jewelry, including body piercings. Leave jewelry and other valuables at  home.  If you're going home the day of surgery  Important: If you don't follow the rules below, we must cancel your surgery.     Arrange for someone to drive you home after surgery. You may not drive, take a taxi or take public transportation by yourself (unless you'll have local anesthesia only).    Arrange for a responsible adult to stay with you overnight. If you don't, we may keep you in the hospital overnight, and you may need to pay the costs yourself.  Questions?   If you have any questions for your care team, list them here: _________________________________________________________________________________________________________________________________________________________________________________________________________________________________________________________________________________________________________________________  For informational purposes only. Not to replace the advice of your health care provider. Copyright   6408-7867 [employer]. All rights reserved. Clinically reviewed by Rosemarie Agudelo MD. [employer] 100278 - REV 07/19.      Before Your Procedure or Hospital Admission  Testing for COVID-19 (Coronavirus)  Thank you for choosing [facility] for your health care needs. This is a very challenging time for everyone. The World Health Organization and the [place] of [place] have declared the COVID-19 virus a pandemic.   Our goal is to keep you and our team here at [facility] safe and healthy. We've taken several steps to make this happen. For example:    We screen our staff, care teams and patients for COVID-19.    Everyone at [facility] must wear a mask and stay 6 feet apart.    We are limiting hospital and clinic visitors.  Before you come in  All patients must get tested for COVID-19. Your test needs to happen 2 to 4 days before you check in to the hospital or surgery site.   A clinic scheduler will call about a week in advance to set up a testing time at  "one of our labs where we'll take a swab of your nose or throat.  Note: If you go to a clinic or pharmacy like [facility] or [facility] for your test, make sure it's a \"RT-PCR\" test, not a \"rapid\" COVID-19 test. (See Questions and Answers below.)  After the test, please stay at home and stay out of contact with other people. It will be harder for you to recover if you get COVID-19 before your treatment.  Please follow all current safety guidelines, including:    Limit trips outside your home.    Limit the number of people you see.    Always wear a mask outside your home.    Use social distancing. (Stay 6 feet away from others whenever you can.)    Wash your hands often.  If your test shows you have COVID-19  If your test is positive, we'll let you know. A positive test means that you have the virus.   We'll probably have to postpone your admission, surgery or procedure. Your doctor will discuss this with you. After that, we'll let you know what to do and when you can reschedule.   We may need to cancel your treatment on short notice for other reasons, too.  If your test shows you DON'T have COVID-19  Even if your test is negative, you may still get COVID-19. It's rare but, sometimes, the test result is wrong. You could also catch the virus after taking the test.   There's a very small chance that you could catch COVID-19 in the hospital or surgery center. [facility] has taken many steps to prevent this from happening.   Day of your surgery or procedure    Please come wearing a mask or something else that covers both your nose and mouth.    When you arrive, we'll ask you some questions to find out if you have any signs or symptoms of COVID-19.    Ask your care team if you can have visitors. All visitors must wear masks and will be screened for signs of COVID-19.  ? Even if no visitors are allowed, you can still have with you:    Your legal guardian or legal decision maker    A parent and one other visitor, if you are " "younger than 18 years old    A partner and a , if you are in labor  ? We might need to teach you about taking care of yourself after surgery. If so, a visitor can come into the hospital to learn about it, too.  ? The rules for visitors change often, depending on how much the virus is spreading. To learn more, see Visiting a Loved One in the Hospital during the COVID-19 Outbreak.  Please call your care team, hospital or surgery center if you have any questions. We thank you for your understanding and for choosing [facility] for your care.   Questions and Answers  Does it matter where I get tested for COVID-19?  Yes. We urge you to get tested at one of our [facility] COVID-19 testing sites. We process these tests in our lab and can get the results quickly. Your [facility] care team needs to get your results before you check in.  What should I do if I can't get tested at [facility]?  You can get tested somewhere else, but you'll need to take these extra steps:  1. Contact your family doctor or clinic to arrange your test.  2. Take the test within 4 days of your surgery or procedure. We can't accept tests older than 4 days.  3. Make sure your doctor or clinic faxes your results to [facility] at [phone].  If we don't get your results in time, we may have to postpone or cancel your treatment.  Ask if you're getting a \"RT-PCR\" COVID-19 test. It should NOT be a \"rapid\" COVID-19 test. Many drug stores use \"rapid\" tests, but they may not be as accurate. We don't accept the results of \"rapid\" tests.  For informational purposes only. Not to replace the advice of your health care provider. Copyright   2020 [facility] [employer]. All rights reserved. Clinically reviewed by In[facility] and the [facility] COVID-19 Clinical Team. [employer] [id number] - REV 10/20.    "

## 2021-06-26 NOTE — PROGRESS NOTES
[address]  [facility] [address]  Dept: [phone]  Dept Fax: [phone]  Primary Provider: Britt Cuba MD  Pre-op Performing Provider: PACO KENDRICK      PREOPERATIVE EVALUATION:  Today's date: 6/14/2021    Loraine Cuadra is a 35 y.o. male who presents for a preoperative evaluation.    Surgical Information:  Surgery/Procedure: Kidney stone  Surgery Location: [facility]  Surgeon:   Surgery Date: 06/28/2021  Time of Surgery: TBD  Where patient plans to recover: At home with family  Fax number for surgical facility:     Type of Anesthesia Anticipated: General    Assessment & Plan      The proposed surgical procedure is considered LOW risk.    Preop general physical exam    Ureteral calculus  - oxyCODONE-acetaminophen (PERCOCET/ENDOCET) 5-325 mg per tablet  Dispense: 1 tablet; Refill: 0  - tamsulosin (FLOMAX) 0.4 mg cap  Dispense: 30 capsule    Exercise-induced asthma  Well controlled    Snoring  Scheduled for evaluation 6/15/2021    Depression, unspecified depression type  stable  - PHQ9 Depression Screen    Allergic rhinitis, unspecified seasonality, unspecified trigger  stable  - fluticasone propionate (FLONASE) 50 mcg/actuation nasal spray  Dispense: 16 g; Refill: 0  - loratadine (CLARITIN) 10 mg tablet; Refill: 0           Risks and Recommendations:  The patient has the following additional risks and recommendations for perioperative complications:   - No identified additional risk factors other than previously addressed    Medication Instructions:  Hold medications the morning of surgery    RECOMMENDATION:  APPROVAL GIVEN to proceed with proposed procedure, without further diagnostic evaluation.        Subjective     HPI related to upcoming procedure:   Loraine Cuadra is a 35 y.o. male diagnosed with right sided kidney stones on may 24.  Has not passed it yet    Preop Questions 6/14/2021   Have you ever had a heart attack or stroke? No    Have you ever had surgery on your heart or blood vessels, such as a stent placement, a coronary artery bypass, or surgery on an artery in your head, neck, heart, or legs? No   Do you have chest pain with activity? No   Do you have a history of  heart failure? No   Do you currently have a cold, bronchitis or symptoms of other infection? No   Do you have a cough, shortness of breath, or wheezing? No   Do you or anyone in your family have previous history of blood clots? YES - maternal grandmother   Do you or does anyone in your family have a serious bleeding problem such as prolonged bleeding following surgeries or cuts? No   Have you ever had problems with anemia or been told to take iron pills? No   Have you had any abnormal blood loss such as black, tarry or bloody stools? No   Have you ever had a blood transfusion? No   Are you willing to have a blood transfusion if it is medically needed before, during, or after your surgery? Yes   Have you or any of your relatives ever had problems with anesthesia? YES - patient had vomiting and diarrhea after anesthesia   Do you have sleep apnea, excessive snoring or daytime drowsiness? YES - being evaluated for sleep apnea tomorrow   Do you have a CPAP machine? No   Do you have any artifical heart valves or other implanted medical devices like a pacemaker, defibrillator, or continuous glucose monitor? No   Do you have artificial joints? No   Are you allergic to latex? No         Health Care Directive:  Patient does not have a Health Care Directive or Living Will:    Preoperative Review of :    reviewed - controlled substances reflected in medication list.    See problem list for active medical problems.  Problems all longstanding and stable, except as noted/documented.  See ROS for pertinent symptoms related to these conditions.      Review of Systems  GENERAL: Fever: no  Chills  no   Fatigue no  HEENT: Cold symptoms:no  RESPIRATORY:  Cough: no       Dyspnea:  no  CARDIOVASCULAR: Chest Pain: no  Palpitations: no  GI: Vomiting: no   Diarrhea: no   : Dysuria: no  Frequency no  NEURO: Dysphasia: no   Motor Weakness: no   Numbness: no  SKIN: Rash: no  HEME:  Bleeding/Bruising Issues: no  MS:  Lower Extremity Swelling no    Exercise Capacity: Climbs one flight of stairs without stopping.       Patient Active Problem List    Diagnosis Date Noted     Ureteral calculus 06/14/2021     Exercise-induced asthma 05/11/2021     Biceps tendonitis, right 05/11/2021     Snoring 05/11/2021     Depression, unspecified depression type 05/11/2021     History reviewed. No pertinent past medical history.  Past Surgical History:   Procedure Laterality Date     ADENOIDECTOMY  1995     LIPOMA RESECTION       SINUS SURGERY  1999     TYMPANOSTOMY TUBE PLACEMENT Bilateral 1995     Current Outpatient Medications   Medication Sig Dispense Refill     albuterol (PROAIR HFA;PROVENTIL HFA;VENTOLIN HFA) 90 mcg/actuation inhaler Inhale 1 puff every 6 (six) hours as needed for wheezing. 18 g 5     cyclobenzaprine (FLEXERIL) 5 MG tablet Take 1 tablet (5 mg total) by mouth daily as needed for muscle spasms. 30 tablet 3     fluticasone propionate (FLONASE) 50 mcg/actuation nasal spray        loratadine (CLARITIN) 10 mg tablet        montelukast (SINGULAIR) 10 mg tablet Take 1 tablet (10 mg total) by mouth at bedtime. 90 tablet 3     oxyCODONE-acetaminophen (PERCOCET/ENDOCET) 5-325 mg per tablet Take 1 tablet by mouth.       tamsulosin (FLOMAX) 0.4 mg cap Take 0.4 mg by mouth Daily after breakfast.       No current facility-administered medications for this visit.        Allergies   Allergen Reactions     Morphine Unknown       Social History     Tobacco Use     Smoking status: Never Smoker     Smokeless tobacco: Never Used     Tobacco comment: no passive exposure   Substance Use Topics     Alcohol use: Yes     Alcohol/week: 3.0 standard drinks     Types: 3 Cans of beer per week     Comment: moderate     "  Family History   Problem Relation Age of Onset     Obesity Mother      Urolithiasis Mother      Thyroid disease Mother      No Medical Problems Father           Social History     Substance and Sexual Activity   Drug Use Yes     Types: Marijuana        Objective     /88 (Patient Site: Left Arm, Patient Position: Sitting, Cuff Size: Adult Regular)   Pulse 83   Temp 98.5  F (36.9  C) (Oral)   Ht 5' 8.9\" (1.75 m)   Wt 188 lb 3.2 oz (85.4 kg)   SpO2 97%   BMI 27.87 kg/m    Physical Exam    GENERAL APPEARANCE: healthy, alert and no distress     EYES: EOMI,  PERRL     HENT: ear canals and TM's normal and nose and mouth without ulcers or lesions     NECK: no adenopathy, no asymmetry, masses, or scars and thyroid normal to palpation     RESP: lungs clear to auscultation - no rales, rhonchi or wheezes     CV: regular rates and rhythm, normal S1 S2, no S3 or S4 and no murmur, click or rub     ABDOMEN:  soft, nontender, no HSM or masses and bowel sounds normal     MS: extremities normal- no gross deformities noted, no evidence of inflammation in joints, FROM in all extremities.     SKIN: no suspicious lesions or rashes     NEURO: Normal strength and tone, sensory exam grossly normal, mentation intact and speech normal     PSYCH: mentation appears normal. and affect normal/bright     LYMPHATICS: No cervical adenopathy    No results for input(s): HGB, PLT, INR, NA, K, CREATININE, HBA1C in the last 93240 hours.     PRE-OP Diagnostics:   No labs were ordered during this visit.  No EKG required for low risk surgery (cataract, skin procedure, breast biopsy, etc).    REVISED CARDIAC RISK INDEX (RCRI)   The patient has the following serious cardiovascular risks for perioperative complications:   - No serious cardiac risks = 0 points    RCRI INTERPRETATION: 0 points: Class I (very low risk - 0.4% complication rate)         Signed Electronically by: Diego Solomon MD              "

## 2021-07-04 NOTE — LETTER
Letter by Diego Solomon MD at      Author: Diego Solomon MD Service: -- Author Type: --    Filed:  Encounter Date: 6/14/2021 Status: (Other)                    My Depression Action Plan  Name: Loraine Cuadra   Date of Birth 1985  Date: 6/14/2021    My Doctor: Britt Cuba MD   My Clinic: [address] [address]  [phone]          GREEN    ZONE   Good Control    What it looks like:     Things are going generally well. You have normal ups and downs. You may even feel depressed from time to time, but bad moods usually last less than a day.   What you need to do:  1. Continue to care for yourself (see self care plan)  2. Check your depression survival kit and update it as needed  3. Follow your physicians recommendations including any medication.  4. Do not stop taking medication unless you consult with your physician first.           YELLOW         ZONE Getting Worse    What it looks like:     Depression is starting to interfere with your life.     It may be hard to get out of bed; you may be starting to isolate yourself from others.    Symptoms of depression are starting to last most all day and this has happened for several days.     You may have suicidal thoughts but they are not constant.   What you need to do:     1. Call your care team. Your response to treatment will improve if you keep your care team informed of your progress. Yellow periods are signs an adjustment may need to be made.     2. Continue your self-care.  Just get dressed and ready for the day.  Don't give yourself time to talk yourself out of it.    3. Talk to someone in your support network.    4. Open up your depression Depression Self-Care Plan / Wellness kit.           RED    ZONE Medical Alert - Get Help    What it looks like:     Depression is seriously interfering with your life.     You may experience these or other symptoms: You cant get out of bed most days,  cant work or engage in other necessary activities, you have trouble taking care of basic hygiene, or basic responsibilities, thoughts of suicide or death that will not go away, self-injurious behavior.     What you need to do:  1. Call your care team and request a same-day appointment. If they are not available (weekends or after hours) call your local crisis line, emergency room or 911.          Depression Self-Care Plan / Wellness Kit    Many people find that medication and therapy are helpful treatments for managing depression. In addition, making small changes to your everyday life can help to boost your mood and improve your wellbeing. Below are some tips for you to consider. Be sure to talk with your medical provider and/or behavioral health consultant if your symptoms are worsening or not improving.     Sleep  Sleep hygiene means all of the habits that support good, restful sleep. It includes maintaining a consistent bedtime and wake time, using your bedroom only for sleeping or sex, and keeping the bedroom dark and free of distractions like a computer, smartphone, or television.     Develop a Healthy Routine  Maintain good hygiene. Get out of bed in the morning, make your bed, brush your teeth, take a shower, and get dressed. Dont spend too much time viewing media that makes you feel stressed. Find time to relax each day.    Exercise  Get some form of exercise every day. This will help reduce pain and release endorphins, the feel good chemicals in your brain. It can be as simple as just going for a walk or doing some gardening, anything that will get you moving.      Diet  Strive to eat healthy foods, including fruits and vegetables. Drink plenty of water. Avoid excessive sugar, caffeine, alcohol, and other mood-altering substances.     Stay Connected with Others  Stay in touch with friends and family members.    Manage Your Mood  Try deep breathing, massage therapy, biofeedback, or meditation. Take part in  fun activities when you can. Try to find something to smile about each day.     Psychotherapy  Be open to working with a therapist if your provider recommends it.     Medication  Be sure to take your medication as prescribed. Most anti-depressants need to be taken every day. It usually takes several weeks for medications to work. Not all medicines work for all people. It is important to follow-up with your provider to make sure you have a treatment plan that is working for you. Do not stop your medication abruptly without first discussing it with your provider.    Crisis Resources   These hotlines are for both adults and children. They and are open 24 hours a day, 7 days a week unless noted otherwise.      National Suicide Prevention Lifeline   0-372-347-TALK (2675)      Crisis Text Line    www.crisistextline.org  Text HOME to 586041 from anywhere in the [place], anytime, about any type of crisis. A live, trained crisis counselor will receive the text and respond quickly.      [facility] Lifeline for LGBTQ Youth  A national crisis intervention and suicide lifeline for LGBTQ youth under 25. Provides a safe place to talk without judgement. Call 1-893.742.4592; text START to 737782 or visit www.theFreeMoneevorproject.org to talk to a trained counselor.      For [facility] crisis numbers, visit the [facility] website at:  https://[place].gov/dhs/people-we-serve/adults/health-care/mental-health/resources/crisis-contacts.jsp

## 2021-07-04 NOTE — LETTER
Letter by Diego Solomon MD at      Author: Diego Solomon MD Service: -- Author Type: --    Filed:  Encounter Date: 6/14/2021 Status: (Other)       My Asthma Action Plan     Name: Loraine Cuadra   YOB: 1985  Date: 6/14/2021   My doctor: Britt Cuba MD    My clinic: [facility]        My Rescue Medicine:   Albuterol (Proair/Ventolin/Proventil HFA) 2-4 puffs EVERY 4 HOURS as needed. Use a spacer if recommended by your provider.   My Asthma Severity:   Intermittent/Exercise Induced  Know your asthma triggers: exercise or sports             GREEN ZONE   Good Control    I feel good    No cough or wheeze    Can work, sleep and play without asthma symptoms     Take your asthma control medicine every day.     1. If exercise triggers your asthma, take your rescue medication    15 minutes before exercise or sports, and    During exercise if you have asthma symptoms  2. Spacer to use with inhaler: If you have a spacer, make sure to use it with your inhaler             YELLOW ZONE Getting Worse  I have ANY of these:    I do not feel good    Cough or wheeze    Chest feels tight    Wake up at night 1. Keep taking your Green Zone medications  2. Start taking your rescue medicine:    every 20 minutes for up to 1 hour. Then every 4 hours for 24-48 hours.  3. If you stay in the Yellow Zone for more than 12-24 hours, contact your doctor.  4. If you do not return to the Green Zone in 12-24 hours or you get worse, start taking your oral steroid medicine if prescribed by your provider.           RED ZONE Medical Alert - Get Help  I have ANY of these:    I feel awful    Medicine is not helping    Breathing getting harder    Trouble walking or talking    Nose opens wide to breathe     1. Take your rescue medicine NOW  2. If your provider has prescribed an oral steroid medicine, start taking it NOW  3. Call your doctor NOW  4. If you are still in the Red Zone after 20 minutes and you have  not reached your doctor:    Take your rescue medicine again and    Call 911 or go to the emergency room right away    See your regular doctor within 2 weeks of an Emergency Room or Urgent Care visit for follow-up treatment.          Annual Reminders:  Meet with Asthma Educator,  Flu Shot in the Fall, consider Pneumonia Vaccination for patients with asthma (aged 19 and older).    Pharmacy:   [id number] - [place] [[place]], [place] - [address]  [address] [facility] [address] [address]  Phone: [phone] Fax: [phone]      Electronically signed by Diego Solomon MD   Date: 06/14/21                      Asthma Triggers  How To Control Things That Make Your Asthma Worse    Triggers are things that make your asthma worse.  Look at the list below to help you find your triggers and what you can do about them.  You can help prevent asthma flare-ups by staying away from your triggers.      Trigger                                                          What you can do   Cigarette Smoke  Tobacco smoke can make asthma worse. Do not allow smoking in your home, car or around you.  Be sure no one smokes at a werner day care or school.  If you smoke, ask your health care provider for ways to help you quit.  Ask family members to quit too.  Ask your health care provider for a referral to Quit Plan to help you quit smoking, or call 9-925-509-PLAN.     Colds, Flu, Bronchitis  These are common triggers of asthma. Wash your hands often.  Dont touch your eyes, nose or mouth.  Get a flu shot every year.     Dust Mites  These are tiny bugs that live in cloth or carpet. They are too small to see. Wash sheets and blankets in hot water every week.   Encase pillows and mattress in dust mite proof covers.  Avoid having carpet if you can. If you have carpet, vacuum weekly.   Use a dust mask and HEPA vacuum.   Pollen and Outdoor Mold  Some people are allergic to trees, grass, or weed pollen, or molds. Try to keep your  windows closed.  Limit time out doors when pollen count is high.   Ask you health care provider about taking medicine during allergy season.     Animal Dander  Some people are allergic to skin flakes, urine or saliva from pets with fur or feathers. Keep pets with fur or feathers out of your home.    If you cant keep the pet outdoors, then keep the pet out of your bedroom.  Keep the bedroom door closed.  Keep pets off cloth furniture and away from stuffed toys.     Mice, Rats, and Cockroaches  Some people are allergic to the waste from these pests.   Cover food and garbage.  Clean up spills and food crumbs.  Store grease in the refrigerator.   Keep food out of the bedroom.   Indoor Mold  This can be a trigger if your home has high moisture. Fix leaking faucets, pipes, or other sources of water.   Clean moldy surfaces.  Dehumidify basement if it is damp and smelly.   Smoke, Strong Odors, and Sprays  These can reduce air quality. Stay away from strong odors and sprays, such as perfume, powder, hair spray, paints, smoke incense, paint, cleaning products, candles and new carpet.   Exercise or Sports  Some people with asthma have this trigger. Be active!  Ask your doctor about taking medicine before sports or exercise to prevent symptoms.    Warm up for 5-10 minutes before and after sports or exercise.     Other Triggers of Asthma  Cold air:  Cover your nose and mouth with a scarf.  Sometimes laughing or crying can be a trigger.  Some medicines and food can trigger asthma.

## 2021-07-06 VITALS
DIASTOLIC BLOOD PRESSURE: 88 MMHG | TEMPERATURE: 98.5 F | OXYGEN SATURATION: 97 % | HEIGHT: 69 IN | SYSTOLIC BLOOD PRESSURE: 136 MMHG | HEART RATE: 83 BPM | BODY MASS INDEX: 27.88 KG/M2 | WEIGHT: 188.2 LBS

## 2021-07-06 ASSESSMENT — PATIENT HEALTH QUESTIONNAIRE - PHQ9: SUM OF ALL RESPONSES TO PHQ QUESTIONS 1-9: 7

## 2021-07-13 ENCOUNTER — E-VISIT (OUTPATIENT)
Dept: FAMILY MEDICINE | Facility: CLINIC | Age: 36
End: 2021-07-13
Payer: COMMERCIAL

## 2021-07-13 DIAGNOSIS — R30.0 DYSURIA: Primary | ICD-10-CM

## 2021-07-13 PROCEDURE — 99207 PR NON-BILLABLE SERV PER CHARTING: CPT | Performed by: PHYSICIAN ASSISTANT

## 2021-07-13 NOTE — PATIENT INSTRUCTIONS
Dear Loraine Cuadra,    We are sorry you are not feeling well. Based on the responses you provided, it is recommended that you be seen in-person in urgent care so we can better evaluate your symptoms. Please click here to find the nearest urgent care location to you.   You will not be charged for this Visit. Thank you for trusting us with your care.    Bridget Lara PA-C

## 2021-07-17 ENCOUNTER — OFFICE VISIT (OUTPATIENT)
Dept: URGENT CARE | Facility: URGENT CARE | Age: 36
End: 2021-07-17
Payer: COMMERCIAL

## 2021-07-17 VITALS
WEIGHT: 190.6 LBS | OXYGEN SATURATION: 98 % | DIASTOLIC BLOOD PRESSURE: 80 MMHG | RESPIRATION RATE: 16 BRPM | TEMPERATURE: 98.5 F | HEART RATE: 86 BPM | BODY MASS INDEX: 28.56 KG/M2 | SYSTOLIC BLOOD PRESSURE: 120 MMHG

## 2021-07-17 DIAGNOSIS — R30.0 DYSURIA: Primary | ICD-10-CM

## 2021-07-17 LAB
ALBUMIN UR-MCNC: 100 MG/DL
APPEARANCE UR: CLEAR
BACTERIA #/AREA URNS HPF: ABNORMAL /HPF
BILIRUB UR QL STRIP: NEGATIVE
COLOR UR AUTO: YELLOW
GLUCOSE UR STRIP-MCNC: NEGATIVE MG/DL
HGB UR QL STRIP: ABNORMAL
KETONES UR STRIP-MCNC: NEGATIVE MG/DL
LEUKOCYTE ESTERASE UR QL STRIP: NEGATIVE
NITRATE UR QL: NEGATIVE
PH UR STRIP: 7 [PH] (ref 5–7)
RBC #/AREA URNS AUTO: ABNORMAL /HPF
SP GR UR STRIP: 1.01 (ref 1–1.03)
SQUAMOUS #/AREA URNS AUTO: ABNORMAL /LPF
UROBILINOGEN UR STRIP-ACNC: 0.2 E.U./DL
WBC #/AREA URNS AUTO: ABNORMAL /HPF

## 2021-07-17 PROCEDURE — 87086 URINE CULTURE/COLONY COUNT: CPT | Performed by: FAMILY MEDICINE

## 2021-07-17 PROCEDURE — 81001 URINALYSIS AUTO W/SCOPE: CPT | Performed by: FAMILY MEDICINE

## 2021-07-17 PROCEDURE — 99213 OFFICE O/P EST LOW 20 MIN: CPT | Performed by: FAMILY MEDICINE

## 2021-07-17 RX ORDER — CIPROFLOXACIN 500 MG/1
500 TABLET, FILM COATED ORAL 2 TIMES DAILY
Qty: 10 TABLET | Refills: 0 | Status: SHIPPED | OUTPATIENT
Start: 2021-07-17 | End: 2021-07-22

## 2021-07-17 NOTE — PROGRESS NOTES
Chief Complaint   Patient presents with     UTI       Medical Decision Making:    ASSESMENT AND PLAN     Loriane was seen today for uti.    Diagnoses and all orders for this visit:    Dysuria  -     UA Macro with Reflex to Micro and Culture - lab collect; Future  -     UA Macro with Reflex to Micro and Culture - lab collect  -     Urine Microscopic  -     Urine Culture Aerobic Bacterial - lab collect  -     ciprofloxacin (CIPRO) 500 MG tablet; Take 1 tablet (500 mg) by mouth 2 times daily for 5 days      Discussed with patient about the urine result  As UA did not show any infection but symptoms are continuing for a week I reviewed with patient could symptoms could be related to prostatitis/epididymitis  Would consider treating with an antibiotic for 5 days  As there is no acute pain in the groin area or any shooting pain I doubt symptoms are from kidney stones.    Tylenol, Fluids and Rest    I have reviewed the nursing notes.    Differential Diagnosis:  UTI: UTI, Dysuria, Pyelonephritis, Kidney Stone and Prostatitis    Review of prior external note(s)   Review of the result(s) of each unique test -     X-Ray was not done.    Time  spent on the date of the encounter doing chart review, review of test results, interpretation of tests, patient visit and documentation     If not improving or if conditions worsens over the next 12-24 hours, go to the Emergency Department    see orders in Epic  Pt verbalized and agreed with the plan and is aware of the worsening symptoms for which would need to follow up .  Pt was stable during time of discharge from the clinic     SUBJECTIVE     Loraine Cuadra is a 35 year old male presenting with a chief complaint of    Chief Complaint   Patient presents with     UTI     UTI    Onset of symptoms was 7day(s).  Course of illness is worsening  Severity moderate  Current and associated symptoms dysuria and frequency  Treatment and measures tried None  Predisposing factors include  none  Patient denies rigors, flank pain, temperature > 101 degrees F. and vomiting              History reviewed. No pertinent past medical history.  Current Outpatient Medications   Medication Sig Dispense Refill     ciprofloxacin (CIPRO) 500 MG tablet Take 1 tablet (500 mg) by mouth 2 times daily for 5 days 10 tablet 0     albuterol (PROAIR HFA/PROVENTIL HFA/VENTOLIN HFA) 108 (90 Base) MCG/ACT inhaler INHALE 1 PUFF EVERY 6 (SIX) HOURS AS NEEDED FOR WHEEZING.       cyclobenzaprine (FLEXERIL) 5 MG tablet Take 1 tablet (5 mg total) by mouth daily as needed for muscle spasms.       fluticasone (FLONASE) 50 MCG/ACT nasal spray Two sprays each nostril once daily       loratadine (CLARITIN) 10 MG tablet Take 10 mg by mouth       montelukast (SINGULAIR) 10 MG tablet        ondansetron (ZOFRAN-ODT) 4 MG ODT tab Take 4 mg by mouth       oxyCODONE-acetaminophen (PERCOCET) 5-325 MG tablet TAKE ONE TABLET BY MOUTH EVERY SIX HOURS AS NEEDED FOR PAIN       tamsulosin (FLOMAX) 0.4 MG capsule Take 0.4 mg by mouth daily       Social History     Tobacco Use     Smoking status: Never Smoker     Smokeless tobacco: Never Used   Substance Use Topics     Alcohol use: Not on file     Family History   Problem Relation Age of Onset     Sleep Apnea Father      Obesity Mother      Urolithiasis Mother      Thyroid Disease Mother      No Known Problems Father          ROS:    10 point ROS of systems including Constitutional, Eyes, Respiratory, Cardiovascular, Gastroenterology, Genitourinary, Integumentary, Muscularskeletal, Psychiatric ,neurological were all negative except for pertinent positives noted in my HPI         OBJECTIVE:    /80 (BP Location: Right arm, Patient Position: Chair, Cuff Size: Adult Large)   Pulse 86   Temp 98.5  F (36.9  C) (Oral)   Resp 16   Wt 86.5 kg (190 lb 9.6 oz)   SpO2 98%   BMI 28.56 kg/m    GENERAL APPEARANCE: healthy, alert and no distress  EYES: EOMI,  PERRL, conjunctiva clear  mouth without ulcers,  erythema or lesions  NECK: supple, nontender, no lymphadenopathy  CV: regular rates and rhythm, normal S1 S2, no murmur noted  ABDOMEN:  soft, nontender, no HSM or masses and bowel sounds normal  PSYCH: mentation appears normal  Physical Exam      (Note was completed, in part, with [email or web address] voice-recognition software. Documentation reviewed, but some grammatical, spelling, and word errors may remain.)  Mirna Mercedes MD on 7/17/2021 at 1:05 PM

## 2021-07-19 LAB — BACTERIA UR CULT: NO GROWTH

## 2021-07-21 ENCOUNTER — MYC MEDICAL ADVICE (OUTPATIENT)
Dept: FAMILY MEDICINE | Facility: CLINIC | Age: 36
End: 2021-07-21

## 2021-07-21 DIAGNOSIS — N20.0 KIDNEY STONE: Primary | ICD-10-CM

## 2021-07-21 NOTE — TELEPHONE ENCOUNTER
Patient passed a stone.   Has a collection kit from another clinic.   Discussed with lab. He can bring in (needs lab only appointment) and we can transfer the stone to one of our collection kits.       Britt Cuba MD

## 2021-07-22 ENCOUNTER — LAB (OUTPATIENT)
Dept: LAB | Facility: CLINIC | Age: 36
End: 2021-07-22
Payer: COMMERCIAL

## 2021-07-22 DIAGNOSIS — N20.0 KIDNEY STONE: ICD-10-CM

## 2021-07-22 PROCEDURE — 99000 SPECIMEN HANDLING OFFICE-LAB: CPT

## 2021-07-22 PROCEDURE — 82365 CALCULUS SPECTROSCOPY: CPT | Mod: 90

## 2021-07-26 LAB
APPEARANCE STONE: NORMAL
COMPN STONE: NORMAL
NUMBER STONE: 1
SIZE STONE: NORMAL MM
SPECIMEN WT: 30 MG

## 2021-08-31 ENCOUNTER — TELEPHONE (OUTPATIENT)
Dept: SLEEP MEDICINE | Facility: CLINIC | Age: 36
End: 2021-08-31

## 2021-08-31 NOTE — TELEPHONE ENCOUNTER
Reason for Call:  Other call back    Detailed comments: Pt would like to schedule sleep study. Please call pt back to schedule.    Phone Number Patient can be reached at: Cell number on file:    Telephone Information:   Mobile [phone]       Best Time: ANYTIME    Can we leave a detailed message on this number? YES    Call taken on 8/31/2021 at 1:36 PM by Angela Russell

## 2021-09-26 ENCOUNTER — HEALTH MAINTENANCE LETTER (OUTPATIENT)
Age: 36
End: 2021-09-26

## 2021-09-28 ENCOUNTER — TELEPHONE (OUTPATIENT)
Dept: SLEEP MEDICINE | Facility: CLINIC | Age: 36
End: 2021-09-28

## 2021-09-28 NOTE — TELEPHONE ENCOUNTER
Reason for call:  Other   Patient called regarding (reason for call): appointment  Additional comments: Patient is requesting a callback to schedule his sleep study. He did not seem to receive the last attempt. Please call back at earliest convenience     Phone number to reach patient:  Cell number on file:    Telephone Information:   Mobile [phone]       Best Time:  any    Can we leave a detailed message on this number?  YES    Travel screening: Negative

## 2021-09-29 DIAGNOSIS — Z76.0 ENCOUNTER FOR MEDICATION REFILL: Primary | ICD-10-CM

## 2021-09-30 RX ORDER — CYCLOBENZAPRINE HCL 5 MG
TABLET ORAL
Qty: 30 TABLET | Refills: 0 | Status: SHIPPED | OUTPATIENT
Start: 2021-09-30 | End: 2021-10-27

## 2021-10-13 ENCOUNTER — TELEPHONE (OUTPATIENT)
Dept: SLEEP MEDICINE | Facility: CLINIC | Age: 36
End: 2021-10-13

## 2021-10-13 DIAGNOSIS — G47.10 HYPERSOMNIA: ICD-10-CM

## 2021-10-13 DIAGNOSIS — R06.81 WITNESSED EPISODE OF APNEA: Primary | ICD-10-CM

## 2021-10-13 DIAGNOSIS — R06.83 SNORING: ICD-10-CM

## 2021-10-13 DIAGNOSIS — G47.00 FREQUENT NOCTURNAL AWAKENING: ICD-10-CM

## 2021-10-13 NOTE — TELEPHONE ENCOUNTER
Received following email please advise:    Peer to Peer Request    Patient Name:                        Loraine Cuadra  :                                      1985  MRN:                                      [id number]    Dr. Gannon,    The authorization for procedure PSG DIAGNSOTIC on date of service 2021 has been denied. We were unsuccessful in obtaining approval through clinical review. A [id number] review can be done by calling the insurance/third party authorization vendor with the following information:    Insurance:       [facility]  Auth Vendor:   [facility]  Phone:             [phone] press 4,[id number] ,1985  Due:                ASAP    Patient ID:       [id number]  Case/Ref #:     [id number]    Denial Reason: Pt lacks qualifying co-morbid conditions and no previous HST completed. Pt meets criteria for HST at this time    Please complete this as soon as you are able and let me know when it is done.    Thank you,    Didi GOMEZ  [employer] Sec[employer] [place]      Didi Jewell  Financial Securing Representative.Sleep Medicine.  [facility] Financial Securing  [email or web address]  Office: [phone]  Fax [phone]  Employed by [facility] services  Business Hours M-F  6:30AM-3:00PM  [address] [place] [address]

## 2021-10-13 NOTE — TELEPHONE ENCOUNTER
Will change the order to HST. Please call patient to inform them of the change and schedule. Thank you.

## 2021-10-14 NOTE — TELEPHONE ENCOUNTER
Patient notified of insurance denial for in lab sleep study. Notified and messaged patient to schedule HST.

## 2021-10-27 ENCOUNTER — OFFICE VISIT (OUTPATIENT)
Dept: SLEEP MEDICINE | Facility: CLINIC | Age: 36
End: 2021-10-27
Payer: COMMERCIAL

## 2021-10-27 DIAGNOSIS — G47.00 FREQUENT NOCTURNAL AWAKENING: ICD-10-CM

## 2021-10-27 DIAGNOSIS — R06.81 WITNESSED EPISODE OF APNEA: ICD-10-CM

## 2021-10-27 DIAGNOSIS — Z76.0 ENCOUNTER FOR MEDICATION REFILL: Primary | ICD-10-CM

## 2021-10-27 DIAGNOSIS — G47.10 HYPERSOMNIA: ICD-10-CM

## 2021-10-27 DIAGNOSIS — R06.83 SNORING: ICD-10-CM

## 2021-10-27 PROCEDURE — 95800 SLP STDY UNATTENDED: CPT | Performed by: INTERNAL MEDICINE

## 2021-10-27 RX ORDER — CYCLOBENZAPRINE HCL 5 MG
TABLET ORAL
Qty: 30 TABLET | Refills: 0 | Status: SHIPPED | OUTPATIENT
Start: 2021-10-27 | End: 2021-11-24

## 2021-10-27 NOTE — PROGRESS NOTES
Device has been registered and shipped via [employer] on 10/27/21. Patient was notified that package was mailed out. Watch [facility] serial number [id number]

## 2021-11-02 NOTE — PROCEDURES
"WatchPAT - HOME SLEEP STUDY INTERPRETATION    Patient: Loraine Cuadra  MRN: [id number]  YOB: 1985  Study Date: 11/01/21  Referring Provider: Britt Cuba MD  Ordering Provider: Jc Gannon DO    Chain of custody patient verification was not enabled.  Chain of custody verification was not present throughout the entire study.     Indications for Home Study: Loraine Cuadra is a 36 year old male who presents with symptoms suggestive of obstructive sleep apnea.    Estimated body mass index is 28.56 kg/m  as calculated from the following:    Height as of 6/15/21: 1.74 m (5' 8.5\").    Weight as of 7/17/21: 86.5 kg (190 lb 9.6 oz).  Total score - [place]: 13 (6/14/2021  9:37 PM)    Data: A full night home sleep study was performed recording the standard physiologic parameters including peripheral arterial tonometry (PAT), sound/snoring, body position,  movement, sound, and oxygen saturation by pulse oximetry. Pulse rate was estimated by oximetry recording. Sleep staging (wake, REM, light, and deep sleep) was derived from PAT signal.  This study was considered adequate based on > 4 hours of quality oximetry and respiratory recording. As specified by the AASM Manual for the Scoring of Sleep and Associated events, version 2.3, Rule VIII.D 1B, 4% oxygen desaturation scoring for hypopneas is used as a standard of care on all home sleep apnea testing.    Total Recording Time: 7 hrs, 34 min  Total Sleep Time: 6 hrs, 49 min  % of Sleep Time REM: 29.6%    Respiratory:  Snoring: Snoring was present.  Respiratory events: The PAT respiratory disturbance index [pRDI] was 20.4 events per hour.  The PAT apnea/hypopnea index [pAHI] was 13 events per hour.  GREGORY was 8.6 events per hour.  During REM sleep the pAHI was 12.7.  Sleep Associated Hypoxemia: sustained hypoxemia was not present. Mean oxygen saturation was 95%.  Minimum was 84%.  Time with saturation less than 88% was 0.6 minutes.    Heart Rate: By pulse " oximetry normal rate was noted.     Position: Percent of time spent: supine - 49.6%, prone - 2.2%, on right - 9.4%, on left - 38%.  pAHI was 20.2 per hour supine, N/A per hour prone, 9.5 per hour on right side, and 3 per hour on left side.     Assessment:   Mild obstructive sleep apnea.  Sleep associated hypoxemia was not present.    Recommendations:  Consider auto-CPAP at 5-15 cmH2O, oral appliance therapy or positional therapy.  Suggest optimizing sleep hygiene and avoiding sleep deprivation.  Weight management.    Diagnosis Code(s): Obstructive Sleep Apnea G47.33    Jc Gannon DO, November 2, 2021   Diplomate, American Board of Internal Medicine, Sleep Medicine

## 2021-11-02 NOTE — PROGRESS NOTES
Watch pat has been scored using rule 1B, 4%.   Patient to follow up with provider to determine appropriate therapy.     Pat AHI: 13    Ordering Provider: DO Edna Tom RPSGT, [facility]  Sleep Technologist  11/02/21

## 2021-11-19 VITALS — HEIGHT: 69 IN | WEIGHT: 180 LBS | BODY MASS INDEX: 26.66 KG/M2

## 2021-11-19 ASSESSMENT — MIFFLIN-ST. JEOR: SCORE: 1738.91

## 2021-11-19 NOTE — PROGRESS NOTES
Loraine is a 36 year old who is being evaluated via a billable video visit.      How would you like to obtain your AVS? MyChart  If the video visit is dropped, the invitation should be resent by: Text to cell phone: [phone]  Will anyone else be joining your video visit? No    Video Start Time: 7:47 AM  Video-Visit Details    Type of service:  Video Visit    Video End Time:7:52 AM    Originating Location (pt. Location): Home    Distant Location (provider location):  [employer] [place] [facility]     Platform used for Video Visit: [employer]     1st attempt at 7:30 AM Lmtcb    Additional 10 minutes on the date of service was spent performing the following:    -Preparing to see the patient (eg, review of tests)   -Ordering medications, tests, or procedures   -Documenting clinical information in the electronic or other health record     Thank you for the opportunity to participate in the care of Loraine Cuadra.     He is a 36 year old  male patient who comes to the sleep medicine clinic for review of sleep study results. The study was completed on 11/01/21  which showed that the patient had mild obstructive sleep apnea with an apnea hypopnea index of 13 events per hour with the lowest O2 Sat of 84%.    Assessment and Plan:  In summary Loraine Cuadra is a 36 year old year old male here for review of sleep study results.  1. Obstructive Sleep Apnea/Hypersomnia  We had an extensive conversation to review the results of his sleep study and to  him on the importance of treating sleep apnea. We discussed the options of treatment with oral appliance versus CPAP. Patient decided to proceed with CPAP.  Since the patient does not have any preference, we will use [employer] as the durable medical equipment company. Due to the patient's extensive history of sinus surgery, I will need to get an ENT evaluation to see if it is safe for him to initiate pressure therapy.  - SLEEP ENT REFERRAL;  Future    Total score - [place]: 13 (6/14/2021  9:37 PM)    Patient Active Problem List   Diagnosis     Exercise-induced asthma     Depression, unspecified depression type     Ureteral calculus     Biceps tendonitis, right     Snoring       Current Outpatient Medications   Medication Sig Dispense Refill     albuterol (PROAIR HFA/PROVENTIL HFA/VENTOLIN HFA) 108 (90 Base) MCG/ACT inhaler INHALE 1 PUFF EVERY 6 (SIX) HOURS AS NEEDED FOR WHEEZING.       cyclobenzaprine (FLEXERIL) 5 MG tablet Take 1 tablet (5 mg total) by mouth daily as needed for muscle spasms. 30 tablet 0     fluticasone (FLONASE) 50 MCG/ACT nasal spray Two sprays each nostril once daily       loratadine (CLARITIN) 10 MG tablet Take 10 mg by mouth       montelukast (SINGULAIR) 10 MG tablet          Allergies   Allergen Reactions     Morphine Other (See Comments) and Unknown     Unknown what happend         Physical Exam:  GEN: NAD  Psych: normal mood, normal affect     Labs/Studies:  - We reviewed the results of the overnight study as described on the HPI.     No results found for: SHAWN      Patient verbalized understanding of these issues, agrees with the plan and all questions were answered today. Patient was given an opportuntity to voice any other symptoms or concerns not listed above. Patient did not have any other symptoms or concerns.      Jc Gannon DO  Board Certified in Internal Medicine and Sleep Medicine      (Note created with Dragon voice recognition and unintended spelling errors and word substitutions may occur)

## 2021-11-19 NOTE — PATIENT INSTRUCTIONS
Your BMI is Body mass index is 26.97 kg/m .  Weight management is a personal decision.  If you are interested in exploring weight loss strategies, the following discussion covers the approaches that may be successful. Body mass index (BMI) is one way to tell whether you are at a healthy weight, overweight, or obese. It measures your weight in relation to your height.  A BMI of 18.5 to 24.9 is in the healthy range. A person with a BMI of 25 to 29.9 is considered overweight, and someone with a BMI of 30 or greater is considered obese. More than two-thirds of American adults are considered overweight or obese.  Being overweight or obese increases the risk for further weight gain. Excess weight may lead to heart disease and diabetes.  Creating and following plans for healthy eating and physical activity may help you improve your health.  Weight control is part of healthy lifestyle and includes exercise, emotional health, and healthy eating habits. Careful eating habits lifelong are the mainstay of weight control. Though there are significant health benefits from weight loss, long-term weight loss with diet alone may be very difficult to achieve- studies show long-term success with dietary management in less than 10% of people. Attaining a healthy weight may be especially difficult to achieve in those with severe obesity. In some cases, medications, devices and surgical management might be considered.  What can you do?  If you are overweight or obese and are interested in methods for weight loss, you should discuss this with your provider.     Consider reducing daily calorie intake by 500 calories.     Keep a food journal.     Avoiding skipping meals, consider cutting portions instead.    Diet combined with exercise helps maintain muscle while optimizing fat loss. Strength training is particularly important for building and maintaining muscle mass. Exercise helps reduce stress, increase energy, and improves fitness.  Increasing exercise without diet control, however, may not burn enough calories to loose weight.       Start walking three days a week 10-20 minutes at a time    Work towards walking thirty minutes five days a week     Eventually, increase the speed of your walking for 1-2 minutes at time    In addition, we recommend that you review healthy lifestyles and methods for weight loss available through the National Institutes of Health patient information sites:  http://win.niddk.nih.gov/publications/index.htm    And look into health and wellness programs that may be available through your health insurance provider, employer, local community center, or zuly club.    Weight management plan: Patient was referred to their PCP to discuss a diet and exercise plan.

## 2021-11-21 ENCOUNTER — HEALTH MAINTENANCE LETTER (OUTPATIENT)
Age: 36
End: 2021-11-21

## 2021-11-22 ENCOUNTER — VIRTUAL VISIT (OUTPATIENT)
Dept: SLEEP MEDICINE | Facility: CLINIC | Age: 36
End: 2021-11-22
Payer: COMMERCIAL

## 2021-11-22 DIAGNOSIS — G47.33 OBSTRUCTIVE SLEEP APNEA: Primary | ICD-10-CM

## 2021-11-22 DIAGNOSIS — G47.10 HYPERSOMNIA: ICD-10-CM

## 2021-11-22 PROCEDURE — 99213 OFFICE O/P EST LOW 20 MIN: CPT | Mod: GT | Performed by: INTERNAL MEDICINE

## 2021-11-24 DIAGNOSIS — Z76.0 ENCOUNTER FOR MEDICATION REFILL: Primary | ICD-10-CM

## 2021-11-24 RX ORDER — CYCLOBENZAPRINE HCL 5 MG
TABLET ORAL
Qty: 30 TABLET | Refills: 0 | Status: SHIPPED | OUTPATIENT
Start: 2021-11-24 | End: 2021-12-24

## 2021-12-22 DIAGNOSIS — Z76.0 ENCOUNTER FOR MEDICATION REFILL: Primary | ICD-10-CM

## 2021-12-24 RX ORDER — CYCLOBENZAPRINE HCL 5 MG
TABLET ORAL
Qty: 30 TABLET | Refills: 1 | Status: SHIPPED | OUTPATIENT
Start: 2021-12-24 | End: 2022-02-23

## 2022-02-23 DIAGNOSIS — Z76.0 ENCOUNTER FOR MEDICATION REFILL: Primary | ICD-10-CM

## 2022-02-23 RX ORDER — CYCLOBENZAPRINE HCL 5 MG
TABLET ORAL
Qty: 30 TABLET | Refills: 0 | Status: SHIPPED | OUTPATIENT
Start: 2022-02-23 | End: 2022-03-22

## 2022-03-03 ENCOUNTER — OFFICE VISIT (OUTPATIENT)
Dept: OTOLARYNGOLOGY | Facility: CLINIC | Age: 37
End: 2022-03-03
Attending: INTERNAL MEDICINE
Payer: COMMERCIAL

## 2022-03-03 DIAGNOSIS — G47.33 OBSTRUCTIVE SLEEP APNEA: ICD-10-CM

## 2022-03-03 DIAGNOSIS — H69.93 DYSFUNCTION OF BOTH EUSTACHIAN TUBES: ICD-10-CM

## 2022-03-03 DIAGNOSIS — H74.13: ICD-10-CM

## 2022-03-03 DIAGNOSIS — R09.81 CONGESTION OF PARANASAL SINUS: Primary | ICD-10-CM

## 2022-03-03 PROCEDURE — 99203 OFFICE O/P NEW LOW 30 MIN: CPT | Performed by: OTOLARYNGOLOGY

## 2022-03-03 NOTE — PROGRESS NOTES
CHIEF COMPLAINT:   Diagnoses       Codes Comments    Obstructive sleep apnea     G47.33              HISTORY OF PRESENT ILLNESS    Loraine was seen at the behest of Chiang, Alfred DO for clearance for CPAP use.  History of multiple sinus surgeries (non recent).  No sinus concerns today.        He is a 36 year old  male patient who comes to the sleep medicine clinic for review of sleep study results. The study was completed on 11/01/21  which showed that the patient had mild obstructive sleep apnea with an apnea hypopnea index of 13 events per hour with the lowest O2 Sat of 84%.     Assessment and Plan:  In summary Loraine Cuadra is a 36 year old year old male here for review of sleep study results.  1. Obstructive Sleep Apnea/Hypersomnia  We had an extensive conversation to review the results of his sleep study and to  him on the importance of treating sleep apnea. We discussed the options of treatment with oral appliance versus CPAP. Patient decided to proceed with CPAP.  Since the patient does not have any preference, we will use [employer] as the [employer] equipment company. Due to the patient's extensive history of sinus surgery, I will need to get an ENT evaluation to see if it is safe for him to initiate pressure therapy.  - SLEEP ENT REFERRAL; Future  REVIEW OF SYSTEMS    Review of Systems as per HPI and PMHx, otherwise 10 system review system are negative.     Morphine     There were no vitals taken for this visit.    HEAD: Normal appearance and symmetry:  No cutaneous lesions.      NECK:  supple     EARS:   Incudopexy present bilaterally     NOSE:     Dorsum:   straight  Septum:  midline  Mucosa:  moist  Inferior turbinates:  1-2+        ORAL CAVITY/OROPHARYNX:     Lips:  Normal.  Tongue: normal, midline  Mucosa:   no lesions  Tonsils:  1+     NECK:  Trachea:  midline.              Thyroid:  normal              Adenopathy:  none        NEURO:   Alert and Oriented     GAIT AND STATION:   normal     RESPIRATORY:   Symmetry and Respiratory effort     PSYCH:  Normal mood and affect     SKIN:   warm and dry         IMPRESSION:    Encounter Diagnoses   Name Primary?     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

## 2022-03-03 NOTE — PATIENT INSTRUCTIONS
CT sinus ordered, you will be called    Follow up in 3-4 weeks in [place] office for nasal endoscopy and hearing test

## 2022-03-03 NOTE — LETTER
3/3/2022         RE: Loraine Cuadra  [address]  [place] [address]        Dear Colleague,    Thank you for referring your patient, Loraine Cuadra, to the [facility]. Please see a copy of my visit note below.    CHIEF COMPLAINT:   Diagnoses       Codes Comments    Obstructive sleep apnea     G47.33              HISTORY OF PRESENT ILLNESS    Loraine was seen at the behest of Jc Gannon DO for clearance for CPAP use.  History of multiple sinus surgeries (non recent).  No sinus concerns today.        He is a 36 year old  male patient who comes to the sleep medicine clinic for review of sleep study results. The study was completed on 11/01/21  which showed that the patient had mild obstructive sleep apnea with an apnea hypopnea index of 13 events per hour with the lowest O2 Sat of 84%.     Assessment and Plan:  In summary Loraine Cuadra is a 36 year old year old male here for review of sleep study results.  1. Obstructive Sleep Apnea/Hypersomnia  We had an extensive conversation to review the results of his sleep study and to  him on the importance of treating sleep apnea. We discussed the options of treatment with oral appliance versus CPAP. Patient decided to proceed with CPAP.  Since the patient does not have any preference, we will use  [employer] as the [employer] equipment company. Due to the patient's extensive history of sinus surgery, I will need to get an ENT evaluation to see if it is safe for him to initiate pressure therapy.  - SLEEP ENT REFERRAL; Future  REVIEW OF SYSTEMS    Review of Systems as per HPI and PMHx, otherwise 10 system review system are negative.     Morphine     There were no vitals taken for this visit.    HEAD: Normal appearance and symmetry:  No cutaneous lesions.      NECK:  supple     EARS:   Incudopexy present bilaterally     NOSE:     Dorsum:   straight  Septum:  midline  Mucosa:  moist  Inferior turbinates:   1-2+        ORAL CAVITY/OROPHARYNX:     Lips:  Normal.  Tongue: normal, midline  Mucosa:   no lesions  Tonsils:  1+     NECK:  Trachea:  midline.              Thyroid:  normal              Adenopathy:  none        NEURO:   Alert and Oriented     GAIT AND STATION:  normal     RESPIRATORY:   Symmetry and Respiratory effort     PSYCH:  Normal mood and affect     SKIN:   warm and dry         IMPRESSION:    Encounter Diagnoses   Name Primary?     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         Again, thank you for allowing me to participate in the care of your patient.        Sincerely,        Romeo Dodd MD

## 2022-03-16 ENCOUNTER — HOSPITAL ENCOUNTER (OUTPATIENT)
Dept: CT IMAGING | Facility: HOSPITAL | Age: 37
Discharge: HOME | End: 2022-03-16
Attending: OTOLARYNGOLOGY | Admitting: OTOLARYNGOLOGY
Payer: COMMERCIAL

## 2022-03-16 DIAGNOSIS — R09.81 CONGESTION OF PARANASAL SINUS: ICD-10-CM

## 2022-03-16 PROCEDURE — 70486 CT MAXILLOFACIAL W/O DYE: CPT

## 2022-03-18 ENCOUNTER — TELEPHONE (OUTPATIENT)
Dept: OTOLARYNGOLOGY | Facility: CLINIC | Age: 37
End: 2022-03-18
Payer: COMMERCIAL

## 2022-03-18 NOTE — TELEPHONE ENCOUNTER
Called Loraine and gave him his CT results.  Per Dr. Dodd I advised the patient that the CT did not show any active sinus infection.  Patient expressed understanding.    [facility]      Carmen Chester RN  [facility]  ENT  [id number] [address] [address]  [email or web address]   Office:[phone]  Employed by [facility]

## 2022-03-18 NOTE — TELEPHONE ENCOUNTER
----- Message from Romeo Dodd MD sent at 3/18/2022 11:01 AM CDT -----  Please advise patient that CT did not show any active sinus infection.

## 2022-03-22 DIAGNOSIS — Z76.0 ENCOUNTER FOR MEDICATION REFILL: ICD-10-CM

## 2022-03-22 RX ORDER — CYCLOBENZAPRINE HCL 5 MG
TABLET ORAL
Qty: 30 TABLET | Refills: 0 | Status: SHIPPED | OUTPATIENT
Start: 2022-03-22 | End: 2022-04-27

## 2022-03-28 ENCOUNTER — OFFICE VISIT (OUTPATIENT)
Dept: OTOLARYNGOLOGY | Facility: CLINIC | Age: 37
End: 2022-03-28
Payer: COMMERCIAL

## 2022-03-28 ENCOUNTER — OFFICE VISIT (OUTPATIENT)
Dept: AUDIOLOGY | Facility: CLINIC | Age: 37
End: 2022-03-28
Payer: COMMERCIAL

## 2022-03-28 DIAGNOSIS — Z98.890 S/P FESS (FUNCTIONAL ENDOSCOPIC SINUS SURGERY): ICD-10-CM

## 2022-03-28 DIAGNOSIS — G47.33 OSA (OBSTRUCTIVE SLEEP APNEA): ICD-10-CM

## 2022-03-28 DIAGNOSIS — H90.3 SENSORINEURAL HEARING LOSS, BILATERAL: Primary | ICD-10-CM

## 2022-03-28 DIAGNOSIS — R09.81 CONGESTION OF PARANASAL SINUS: Primary | ICD-10-CM

## 2022-03-28 DIAGNOSIS — Z01.10 NORMAL HEARING NOTED ON EXAMINATION: ICD-10-CM

## 2022-03-28 PROCEDURE — 99207 PR NO CHARGE LOS: CPT | Performed by: AUDIOLOGIST

## 2022-03-28 PROCEDURE — 92567 TYMPANOMETRY: CPT | Performed by: AUDIOLOGIST

## 2022-03-28 PROCEDURE — 92557 COMPREHENSIVE HEARING TEST: CPT | Performed by: AUDIOLOGIST

## 2022-03-28 PROCEDURE — 99213 OFFICE O/P EST LOW 20 MIN: CPT | Mod: 25 | Performed by: OTOLARYNGOLOGY

## 2022-03-28 PROCEDURE — 31231 NASAL ENDOSCOPY DX: CPT | Mod: 52 | Performed by: OTOLARYNGOLOGY

## 2022-03-28 NOTE — LETTER
3/28/2022         RE: Loraine Cuadra  [address]  [place] [address]        Dear Colleague,    Thank you for referring your patient, Loraine Cuadra, to the [facility]. Please see a copy of my visit note below.    CHIEF COMPLAINT:  Recheck      HISTORY OF PRESENT ILLNESS    Loraine was seen in follow up for Otoendoscopy and sinus endoscopy and CT sinus review.   No new concerns.  He has not yet started on CPAP.  Last FESS was done at [facility] by Cande Miranda.  Some thick mucus discharge but no salty clear discharge.        CT sinus shows evidence of prior sinus surgery.  My review demonstrates possible anterior skull base defect on right.   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          REVIEW OF SYSTEMS    Review of Systems: a 10-system review is reviewed at this encounter.  See scanned document.     Morphine     PHYSICAL EXAM:        HEAD: Normal appearance and symmetry:  No cutaneous lesions.      EARS:   Auricles normal    RIGHT EAR (supine) Below:              LEFT EAR (supine) below:             NASAL ENDOSCOPY    RIGHT: evidence of prior sinus surgery with no obvious defect              LEFT: evidence of prior sinus surgery with no obvious defecgt                 ORAL CAVITY/OROPHARYNX:    Lips:  Normal.     NECK:  Trachea:  midline       NEURO:   Alert and Oriented    GAIT AND STATION:  normal     RESPIRATORY:   Symmetry and Respiratory effort    PSYCH:   normal mood and affect    SKIN:  warm and dry     AUDIOGRAM:  wnl    IMPRESSION:   Encounter Diagnoses   Name Primary?     Congestion of  paranasal sinus Yes     Normal hearing noted on examination      S/P FESS (functional endoscopic sinus surgery)      RICO (obstructive sleep apnea)        RECOMMENDATIONS:    Patient has a remote history of endoscopic sinus surgery and no clinical symptoms of CSF leak.  However his CT is somewhat concerning for possible anterior skull base defect.  This may be volume averaging issue.  I will asked Dr. Bunny Hendrix at the [facility] to review the chart and give his opinion.      Again, thank you for allowing me to participate in the care of your patient.        Sincerely,        Romeo Dodd MD

## 2022-03-28 NOTE — PROGRESS NOTES
AUDIOLOGY REPORT    SUMMARY: Audiology visit completed. See audiogram for results.      RECOMMENDATIONS: Follow-up with ENT.    Ashley Francisco, CCC-A  [place] Licensed Audiologist #[id number]

## 2022-03-28 NOTE — PROGRESS NOTES
CHIEF COMPLAINT:  Recheck      HISTORY OF PRESENT ILLNESS    Loraine was seen in follow up for Otoendoscopy and sinus endoscopy and CT sinus review.   No new concerns.  He has not yet started on CPAP.  Last FESS was done at [facility] by Cande Miranda.  Some thick mucus discharge but no salty clear discharge.        CT sinus shows evidence of prior sinus surgery.  My review demonstrates possible anterior skull base defect on right.   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          REVIEW OF SYSTEMS    Review of Systems: a 10-system review is reviewed at this encounter.  See scanned document.     Morphine     PHYSICAL EXAM:        HEAD: Normal appearance and symmetry:  No cutaneous lesions.      EARS:   Auricles normal    RIGHT EAR (supine) Below:              LEFT EAR (supine) below:             NASAL ENDOSCOPY    RIGHT: evidence of prior sinus surgery with no obvious defect              LEFT: evidence of prior sinus surgery with no obvious defecgt                 ORAL CAVITY/OROPHARYNX:    Lips:  Normal.     NECK:  Trachea:  midline       NEURO:   Alert and Oriented    GAIT AND STATION:  normal     RESPIRATORY:   Symmetry and Respiratory effort    PSYCH:   normal mood and affect    SKIN:  warm and dry     AUDIOGRAM:  wnl    IMPRESSION:   Encounter Diagnoses   Name Primary?     Congestion of paranasal sinus Yes     Normal hearing noted on examination      S/P FESS (functional endoscopic sinus surgery)      RICO (obstructive sleep apnea)        RECOMMENDATIONS:    Patient has a remote history of endoscopic sinus surgery and no clinical symptoms  of CSF leak.  However his CT is somewhat concerning for possible anterior skull base defect.  This may be volume averaging issue.  I will asked Dr. Bunny Hendrix at the [facility] to review the chart and give his opinion.

## 2022-04-19 ENCOUNTER — TELEPHONE (OUTPATIENT)
Dept: OTOLARYNGOLOGY | Facility: CLINIC | Age: 37
End: 2022-04-19
Payer: COMMERCIAL

## 2022-04-19 NOTE — TELEPHONE ENCOUNTER
Talked with Loraine per Dr. Dodd I advised the patient that he recommends trying the CPAP for 30-45 minutes while awake to see if he gets symptoms of a headache before he starts routinely.  Patient expressed understanding.        [facility]      Carmen Chester RN  [facility]  ENT  [address] [place] [id number]  [email or web address]   Office:[phone]  Employed by [facility] [employer]

## 2022-04-27 DIAGNOSIS — Z76.0 ENCOUNTER FOR MEDICATION REFILL: Primary | ICD-10-CM

## 2022-04-27 RX ORDER — CYCLOBENZAPRINE HCL 5 MG
TABLET ORAL
Qty: 30 TABLET | Refills: 0 | Status: SHIPPED | OUTPATIENT
Start: 2022-04-27 | End: 2022-05-23

## 2022-05-23 ENCOUNTER — MYC MEDICAL ADVICE (OUTPATIENT)
Dept: FAMILY MEDICINE | Facility: CLINIC | Age: 37
End: 2022-05-23
Payer: COMMERCIAL

## 2022-05-23 DIAGNOSIS — Z76.0 ENCOUNTER FOR MEDICATION REFILL: Primary | ICD-10-CM

## 2022-05-23 DIAGNOSIS — N20.0 NEPHROLITHIASIS: Primary | ICD-10-CM

## 2022-05-23 RX ORDER — CYCLOBENZAPRINE HCL 5 MG
TABLET ORAL
Qty: 30 TABLET | Refills: 0 | Status: SHIPPED | OUTPATIENT
Start: 2022-05-23 | End: 2022-06-27

## 2022-05-26 RX ORDER — TAMSULOSIN HYDROCHLORIDE 0.4 MG/1
0.4 CAPSULE ORAL DAILY
Qty: 30 CAPSULE | Refills: 1 | Status: SHIPPED | OUTPATIENT
Start: 2022-05-26 | End: 2022-08-10

## 2022-05-26 RX ORDER — OXYCODONE HYDROCHLORIDE 5 MG/1
5 TABLET ORAL EVERY 6 HOURS PRN
Qty: 12 TABLET | Refills: 0 | Status: SHIPPED | OUTPATIENT
Start: 2022-05-26 | End: 2022-08-10

## 2022-05-26 NOTE — CONFIDENTIAL NOTE
Patient has history of kidney stones. Will send one time prescription for prn oxycodone. No refills without being seen. Also tamsulosin.     He has zofran, but I can send in refills if needed.     Britt Cuba MD

## 2022-06-27 DIAGNOSIS — Z76.0 ENCOUNTER FOR MEDICATION REFILL: Primary | ICD-10-CM

## 2022-06-27 RX ORDER — CYCLOBENZAPRINE HCL 5 MG
TABLET ORAL
Qty: 30 TABLET | Refills: 0 | Status: SHIPPED | OUTPATIENT
Start: 2022-06-27 | End: 2022-07-21

## 2022-07-02 ENCOUNTER — HEALTH MAINTENANCE LETTER (OUTPATIENT)
Age: 37
End: 2022-07-02

## 2022-07-20 ENCOUNTER — OFFICE VISIT (OUTPATIENT)
Dept: INTERNAL MEDICINE | Facility: CLINIC | Age: 37
End: 2022-07-20
Payer: COMMERCIAL

## 2022-07-20 VITALS
WEIGHT: 189.4 LBS | OXYGEN SATURATION: 97 % | BODY MASS INDEX: 28.38 KG/M2 | SYSTOLIC BLOOD PRESSURE: 130 MMHG | DIASTOLIC BLOOD PRESSURE: 86 MMHG | HEART RATE: 92 BPM

## 2022-07-20 DIAGNOSIS — R10.9 LEFT FLANK PAIN: Primary | ICD-10-CM

## 2022-07-20 LAB
ALBUMIN UR-MCNC: >=300 MG/DL
ANION GAP SERPL CALCULATED.3IONS-SCNC: 9 MMOL/L (ref 7–15)
APPEARANCE UR: CLEAR
BACTERIA #/AREA URNS HPF: ABNORMAL /HPF
BILIRUB UR QL STRIP: NEGATIVE
BUN SERPL-MCNC: 10.9 MG/DL (ref 6–20)
CALCIUM SERPL-MCNC: 9.3 MG/DL (ref 8.6–10)
CHLORIDE SERPL-SCNC: 105 MMOL/L (ref 98–107)
COLOR UR AUTO: YELLOW
CREAT SERPL-MCNC: 0.85 MG/DL (ref 0.67–1.17)
DEPRECATED HCO3 PLAS-SCNC: 27 MMOL/L (ref 22–29)
GFR SERPL CREATININE-BSD FRML MDRD: >90 ML/MIN/1.73M2
GLUCOSE SERPL-MCNC: 104 MG/DL (ref 70–99)
GLUCOSE UR STRIP-MCNC: NEGATIVE MG/DL
HGB UR QL STRIP: ABNORMAL
KETONES UR STRIP-MCNC: NEGATIVE MG/DL
LEUKOCYTE ESTERASE UR QL STRIP: NEGATIVE
NITRATE UR QL: NEGATIVE
PH UR STRIP: 7 [PH] (ref 5–8)
POTASSIUM SERPL-SCNC: 3.7 MMOL/L (ref 3.4–5.3)
RBC #/AREA URNS AUTO: ABNORMAL /HPF
SODIUM SERPL-SCNC: 141 MMOL/L (ref 136–145)
SP GR UR STRIP: 1.02 (ref 1–1.03)
SQUAMOUS #/AREA URNS AUTO: ABNORMAL /LPF
UROBILINOGEN UR STRIP-ACNC: 0.2 E.U./DL
WBC #/AREA URNS AUTO: ABNORMAL /HPF

## 2022-07-20 PROCEDURE — 36415 COLL VENOUS BLD VENIPUNCTURE: CPT | Performed by: INTERNAL MEDICINE

## 2022-07-20 PROCEDURE — 80048 BASIC METABOLIC PNL TOTAL CA: CPT | Performed by: INTERNAL MEDICINE

## 2022-07-20 PROCEDURE — 81001 URINALYSIS AUTO W/SCOPE: CPT | Performed by: INTERNAL MEDICINE

## 2022-07-20 PROCEDURE — 99213 OFFICE O/P EST LOW 20 MIN: CPT | Performed by: INTERNAL MEDICINE

## 2022-07-20 RX ORDER — ONDANSETRON 4 MG/1
4 TABLET, FILM COATED ORAL EVERY 8 HOURS PRN
COMMUNITY
End: 2022-08-10

## 2022-07-20 RX ORDER — CETIRIZINE HYDROCHLORIDE 10 MG/1
10 TABLET ORAL DAILY
COMMUNITY
Start: 2021-08-01

## 2022-07-20 RX ORDER — IBUPROFEN 200 MG
200 TABLET ORAL EVERY 4 HOURS PRN
COMMUNITY

## 2022-07-20 ASSESSMENT — ASTHMA QUESTIONNAIRES
QUESTION_2 LAST FOUR WEEKS HOW OFTEN HAVE YOU HAD SHORTNESS OF BREATH: ONCE OR TWICE A WEEK
ACT_TOTALSCORE: 21
QUESTION_5 LAST FOUR WEEKS HOW WOULD YOU RATE YOUR ASTHMA CONTROL: WELL CONTROLLED
ACT_TOTALSCORE: 21
QUESTION_1 LAST FOUR WEEKS HOW MUCH OF THE TIME DID YOUR ASTHMA KEEP YOU FROM GETTING AS MUCH DONE AT WORK, SCHOOL OR AT HOME: A LITTLE OF THE TIME
QUESTION_3 LAST FOUR WEEKS HOW OFTEN DID YOUR ASTHMA SYMPTOMS (WHEEZING, COUGHING, SHORTNESS OF BREATH, CHEST TIGHTNESS OR PAIN) WAKE YOU UP AT NIGHT OR EARLIER THAN USUAL IN THE MORNING: NOT AT ALL
QUESTION_4 LAST FOUR WEEKS HOW OFTEN HAVE YOU USED YOUR RESCUE INHALER OR NEBULIZER MEDICATION (SUCH AS ALBUTEROL): ONCE A WEEK OR LESS

## 2022-07-20 ASSESSMENT — PATIENT HEALTH QUESTIONNAIRE - PHQ9
SUM OF ALL RESPONSES TO PHQ QUESTIONS 1-9: 10
SUM OF ALL RESPONSES TO PHQ QUESTIONS 1-9: 10
10. IF YOU CHECKED OFF ANY PROBLEMS, HOW DIFFICULT HAVE THESE PROBLEMS MADE IT FOR YOU TO DO YOUR WORK, TAKE CARE OF THINGS AT HOME, OR GET ALONG WITH OTHER PEOPLE: SOMEWHAT DIFFICULT

## 2022-07-20 NOTE — PROGRESS NOTES
"  Assessment & Plan     (R10.9) Left flank pain  (primary encounter diagnosis)  Comment: similar to prior stone symptoms.  Plan: UA Macro with Reflex to Micro and Culture - lab        collect, Basic metabolic panel  (Ca, Cl, CO2,         Creat, Gluc, K, Na, BUN), Urine Microscopic        UA, BMP. Likely proceed with CT if suggestive. Will continue flomax for now. Pain is mild so no opioids given at this time.               BMI:   Estimated body mass index is 28.38 kg/m  as calculated from the following:    Height as of 11/19/21: 1.74 m (5' 8.5\").    Weight as of this encounter: 85.9 kg (189 lb 6.4 oz).       Depression Screening Follow Up    PHQ 7/20/2022   PHQ-9 Total Score 10   Q9: Thoughts of better off dead/self-harm past 2 weeks Not at all         Follow Up Actions Taken  Referred patient back to PCP         Return in about 1 week (around 7/27/2022), or if symptoms worsen or fail to improve.    Parmjit Vines MD  [facility]   Loraine is a 36 year old, presenting for the following health issues:  Flank Pain (Left; foamy urine; x 6 weeks)      History of Present Illness       Reason for visit:  Suspected kidney stone  Symptom onset:  More than a month  Symptoms include:  Back and abdominal pain, occasional fever accompnying pain, foamy urine, fatigue  Symptom intensity:  Mild  Symptom progression:  Staying the same  Had these symptoms before:  Yes  Has tried/received treatment for these symptoms:  Yes  Previous treatment was successful:  Yes  Prior treatment description:  Same prescription i am on now, passed naturally after about 45 days  What makes it worse:  Alcohol. Ab exercise. Pain has been minimal, but if im in pain sitting can make it feel worse  What makes it better:  Laying down when in pain    He eats 2-3 servings of fruits and vegetables daily.He consumes 0 sweetened beverage(s) daily.He exercises with enough effort to increase his heart rate 9 or less " minutes per day.  He exercises with enough effort to increase his heart rate 3 or less days per week.   He is taking medications regularly.    Today's PHQ-9         PHQ-9 Total Score: 10    PHQ-9 Q9 Thoughts of better off dead/self-harm past 2 weeks :   Not at all    How difficult have these problems made it for you to do your work, take care of things at home, or get along with other people: Somewhat difficult             Review of Systems         Objective    /86 (BP Location: Left arm, Patient Position: Sitting, Cuff Size: Adult Regular)   Pulse 92   Wt 85.9 kg (189 lb 6.4 oz)   SpO2 97%   BMI 28.38 kg/m    Body mass index is 28.38 kg/m .  Physical Exam                       .  ..

## 2022-07-21 DIAGNOSIS — Z76.0 ENCOUNTER FOR MEDICATION REFILL: Primary | ICD-10-CM

## 2022-07-21 RX ORDER — CYCLOBENZAPRINE HCL 5 MG
TABLET ORAL
Qty: 30 TABLET | Refills: 0 | Status: SHIPPED | OUTPATIENT
Start: 2022-07-21 | End: 2022-08-10

## 2022-08-01 ENCOUNTER — HOSPITAL ENCOUNTER (OUTPATIENT)
Dept: CT IMAGING | Facility: HOSPITAL | Age: 37
Discharge: HOME | End: 2022-08-01
Attending: INTERNAL MEDICINE | Admitting: INTERNAL MEDICINE
Payer: COMMERCIAL

## 2022-08-01 DIAGNOSIS — R10.9 LEFT FLANK PAIN: ICD-10-CM

## 2022-08-01 PROCEDURE — 74176 CT ABD & PELVIS W/O CONTRAST: CPT

## 2022-08-05 ENCOUNTER — MYC MEDICAL ADVICE (OUTPATIENT)
Dept: INTERNAL MEDICINE | Facility: CLINIC | Age: 37
End: 2022-08-05

## 2022-08-08 ASSESSMENT — ENCOUNTER SYMPTOMS
HEMATOCHEZIA: 0
FEVER: 0
CHILLS: 0
DIZZINESS: 0
PARESTHESIAS: 0
ARTHRALGIAS: 0
DIARRHEA: 0
NERVOUS/ANXIOUS: 0
SHORTNESS OF BREATH: 0
SORE THROAT: 0
ABDOMINAL PAIN: 0
CONSTIPATION: 0
DYSURIA: 0
JOINT SWELLING: 0
EYE PAIN: 0
WEAKNESS: 0
FREQUENCY: 0
HEARTBURN: 0
HEMATURIA: 0
NAUSEA: 0
PALPITATIONS: 0
COUGH: 0
HEADACHES: 0
MYALGIAS: 0

## 2022-08-10 ENCOUNTER — OFFICE VISIT (OUTPATIENT)
Dept: FAMILY MEDICINE | Facility: CLINIC | Age: 37
End: 2022-08-10
Payer: COMMERCIAL

## 2022-08-10 VITALS
RESPIRATION RATE: 12 BRPM | OXYGEN SATURATION: 98 % | TEMPERATURE: 98.7 F | BODY MASS INDEX: 28.45 KG/M2 | WEIGHT: 192.12 LBS | DIASTOLIC BLOOD PRESSURE: 74 MMHG | SYSTOLIC BLOOD PRESSURE: 106 MMHG | HEIGHT: 69 IN | HEART RATE: 71 BPM

## 2022-08-10 DIAGNOSIS — F32.A DEPRESSION, UNSPECIFIED DEPRESSION TYPE: ICD-10-CM

## 2022-08-10 DIAGNOSIS — Z76.0 ENCOUNTER FOR MEDICATION REFILL: ICD-10-CM

## 2022-08-10 DIAGNOSIS — J45.990 EXERCISE-INDUCED ASTHMA: ICD-10-CM

## 2022-08-10 DIAGNOSIS — Z00.00 ROUTINE GENERAL MEDICAL EXAMINATION AT A HEALTH CARE FACILITY: Primary | ICD-10-CM

## 2022-08-10 PROCEDURE — 99395 PREV VISIT EST AGE 18-39: CPT | Mod: 25 | Performed by: FAMILY MEDICINE

## 2022-08-10 PROCEDURE — 0064A COVID-19,PF,MODERNA (18+ YRS BOOSTER .25ML): CPT | Performed by: FAMILY MEDICINE

## 2022-08-10 PROCEDURE — 91306 COVID-19,PF,MODERNA (18+ YRS BOOSTER .25ML): CPT | Performed by: FAMILY MEDICINE

## 2022-08-10 RX ORDER — MONTELUKAST SODIUM 10 MG/1
10 TABLET ORAL AT BEDTIME
Qty: 90 TABLET | Refills: 3 | Status: SHIPPED | OUTPATIENT
Start: 2022-08-10

## 2022-08-10 RX ORDER — CYCLOBENZAPRINE HCL 5 MG
5 TABLET ORAL
Qty: 30 TABLET | Refills: 11 | Status: SHIPPED | OUTPATIENT
Start: 2022-08-10

## 2022-08-10 RX ORDER — ALBUTEROL SULFATE 90 UG/1
1-2 AEROSOL, METERED RESPIRATORY (INHALATION) EVERY 6 HOURS PRN
Qty: 18 G | Refills: 3 | Status: SHIPPED | OUTPATIENT
Start: 2022-08-10

## 2022-08-10 ASSESSMENT — ENCOUNTER SYMPTOMS
SORE THROAT: 0
CHILLS: 0
EYE PAIN: 0
CONSTIPATION: 0
PARESTHESIAS: 0
WEAKNESS: 0
DYSURIA: 0
SHORTNESS OF BREATH: 0
HEMATOCHEZIA: 0
DIARRHEA: 0
HEADACHES: 0
ARTHRALGIAS: 0
ABDOMINAL PAIN: 0
MYALGIAS: 0
HEARTBURN: 0
HEMATURIA: 0
PALPITATIONS: 0
NERVOUS/ANXIOUS: 0
JOINT SWELLING: 0
FEVER: 0
FREQUENCY: 0
COUGH: 0
DIZZINESS: 0
NAUSEA: 0

## 2022-12-06 PROCEDURE — 88305 TISSUE EXAM BY PATHOLOGIST: CPT | Mod: TC,ORL | Performed by: OPHTHALMOLOGY

## 2022-12-07 ENCOUNTER — LAB REQUISITION (OUTPATIENT)
Dept: LAB | Facility: CLINIC | Age: 37
End: 2022-12-07
Payer: COMMERCIAL

## 2022-12-13 LAB
PATH REPORT.COMMENTS IMP SPEC: NORMAL
PATH REPORT.FINAL DX SPEC: NORMAL
PATH REPORT.GROSS SPEC: NORMAL
PATH REPORT.MICROSCOPIC SPEC OTHER STN: NORMAL
PATH REPORT.MICROSCOPIC SPEC OTHER STN: NORMAL
PATH REPORT.RELEVANT HX SPEC: NORMAL
PHOTO IMAGE: NORMAL

## 2022-12-13 PROCEDURE — 88341 IMHCHEM/IMCYTCHM EA ADD ANTB: CPT | Mod: 26 | Performed by: PATHOLOGY

## 2022-12-13 PROCEDURE — 88342 IMHCHEM/IMCYTCHM 1ST ANTB: CPT | Mod: 26 | Performed by: PATHOLOGY

## 2022-12-13 PROCEDURE — 88305 TISSUE EXAM BY PATHOLOGIST: CPT | Mod: 26 | Performed by: PATHOLOGY

## 2023-01-08 ENCOUNTER — HEALTH MAINTENANCE LETTER (OUTPATIENT)
Age: 38
End: 2023-01-08

## 2023-07-11 ENCOUNTER — PATIENT OUTREACH (OUTPATIENT)
Dept: CARE COORDINATION | Facility: CLINIC | Age: 38
End: 2023-07-11
Payer: COMMERCIAL

## 2023-09-23 ENCOUNTER — HEALTH MAINTENANCE LETTER (OUTPATIENT)
Age: 38
End: 2023-09-23

## 2025-08-03 ENCOUNTER — HEALTH MAINTENANCE LETTER (OUTPATIENT)
Age: 40
End: 2025-08-03